# Patient Record
Sex: MALE | Race: WHITE | URBAN - METROPOLITAN AREA
[De-identification: names, ages, dates, MRNs, and addresses within clinical notes are randomized per-mention and may not be internally consistent; named-entity substitution may affect disease eponyms.]

---

## 2017-09-15 NOTE — PROGRESS NOTES
"SUBJECTIVE:   CC: Prabhjot Artis is an 32 year old male who presents for preventative health visit.     Physical   Annual:     Getting at least 3 servings of Calcium per day::  NO    Bi-annual eye exam::  Yes    Dental care twice a year::  Yes    Sleep apnea or symptoms of sleep apnea::  Excessive snoring    Taking medications regularly::  Yes    Medication side effects::  None    Additional concerns today::  YES    1) sleep, excessive snoring -- heavy snoring with suspected apneic spells    2) right elbow pain, onset 3 weeks, don't recalled hurting -- pain located over the right olecranon--pain with extension at times.    3) anxiety , getting worse - changed jobs May 2017 with increased stress and anxiety--new position in [employer]. Previous use of Lexapro gave significant side effects. Currently appears to have anxiety attacks which could be consistent with panic attacks. He does not have symptoms daily. It appears that he wishes to have something on hand to dampen down the anxiety which makes it impossible to relax. We'll look at intermittent use of Ativan as needed and follow-up. Has had counseling in the past for anxiety and wishes not to pursue this.      Anxiety Follow-Up--became an acute problem with a recent change in job and new area of responsibility. 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 He feels a good deal of stress. There are days now that he has improved somewhat without anxiety but is still has what appear to be panic attacks.      Status since last visit: Worsened . Patient have not take the medication since first prescribed due of feeling dizziness and nausea     Other associated symptoms: \"high level anxiety with episode\"     Complicating factors:   Significant life event: Yes-  Completed new career    Current substance abuse: None  Depression symptoms: No  KINA-7 SCORE 2/4/2015   Total Score 16       GAD7      Asthma Follow-Up--has been off Advair and needs to return to " this. Having increasing symptoms of coughing and shortness of breath. Also has restarted smoking.    Was ACT completed today?  No      Respiratory symptoms:   Cough: No    Wheezing: Yes-  Couple episode    Shortness of breath: Yes-  More of shortness of breath     Use of short- acting(rescue) inhaler: Yes, will need refills     Taking controlled (daily) meds as prescribed: Yes    ER/UC visits or hospital admissions since last visit: [facility] - Last year, patient states he got shot of prednisone     Recent asthma triggers that patient is dealing with: dust mites and mold             Today's PHQ-2 Score:   PHQ-2 ( 1999 Pfizer) 9/20/2017   Q1: Little interest or pleasure in doing things 0   Q2: Feeling down, depressed or hopeless 0   PHQ-2 Score 0   Q1: Little interest or pleasure in doing things Not at all   Q2: Feeling down, depressed or hopeless Not at all   PHQ-2 Score 0       Abuse: Current or Past(Physical, Sexual or Emotional)- No  Do you feel safe in your environment - Yes    Social History   Substance Use Topics     Smoking status: Current Every Day Smoker     Packs/day: 0.25     Years: 10.00     Types: Cigarettes     Smokeless tobacco: Never Used      Comment: 3-4 cigarettes per day     Alcohol use 10.5 oz/week     21 Standard drinks or equivalent per week     The patient does not drink >3 drinks per day nor >7 drinks per week.    Last PSA: No results found for: PSA    Reviewed orders with patient. Reviewed health maintenance and updated orders accordingly - Yes      Reviewed and updated as needed this visit by clinical staff  Tobacco  Allergies  Med Hx  Surg Hx  Fam Hx  Soc Hx        Reviewed and updated as needed this visit by Provider              ROS:  C: NEGATIVE for fever, chills, change in weight  CONSTITUTIONAL: Is overweight  I: NEGATIVE for worrisome rashes, moles or lesions  E: NEGATIVE for vision changes or irritation  ENT: NEGATIVE for ear, mouth and throat problems  RESP: Increasing mild  "intermittent asthma symptoms due to being off maintenance medication. Also restarted smoking. Reported to have increased snoring episodes and also has awakened himself at one time gasping for breath and his wife has noted some apneic spells. He has never been tested for sleep apnea but symptoms appear to have a strong likelihood.  B: NEGATIVE for masses, tenderness or discharge  CV: NEGATIVE for chest pain, palpitations or peripheral edema  GI: NEGATIVE for nausea, abdominal pain, heartburn, or change in bowel habits   male: negative for dysuria, hematuria, decreased urinary stream, erectile dysfunction, urethral discharge  MUSCULOSKELETAL: Intermittent pain at the right olecranon with forceful extension of his right arm. No known trauma or swelling. No neurologic changes.  N: NEGATIVE for weakness, dizziness or paresthesias  E: NEGATIVE for temperature intolerance, skin/hair changes  H: NEGATIVE for bleeding problems  PSYCHIATRIC: Increasing anxiety with some panic feelings. No depression appears noted.    OBJECTIVE:   /90  Pulse 68  Temp 98.5  F (36.9  C) (Temporal)  Resp 16  Ht 5' 10\" (1.778 m)  Wt 220 lb (99.8 kg)  BMI 31.57 kg/m2    EXAM:  GENERAL: healthy, alert and no distress  EYES: Eyes grossly normal to inspection, PERRL and conjunctivae and sclerae normal  HENT: ear canals and TM's normal, nose and mouth without ulcers or lesions  NECK: no adenopathy, no asymmetry, masses, or scars and thyroid normal to palpation  RESP: lungs clear to auscultation - no rales, rhonchi or wheezes  BREAST: normal without masses, tenderness or nipple discharge and no palpable axillary masses or adenopathy  CV: regular rate and rhythm, normal S1 S2, no S3 or S4, no murmur, click or rub, no peripheral edema and peripheral pulses strong  ABDOMEN: soft, nontender, no hepatosplenomegaly, no masses and bowel sounds normal   (male): normal male genitalia without lesions or urethral discharge, no hernia  RECTAL: " deferred  MS: no gross musculoskeletal defects noted, no edema  MS: Examination of the right elbow appears to show normal range of motion, no olecranon swelling or definite tenderness.  SKIN: no suspicious lesions or rashes  NEURO: Normal strength and tone, mentation intact and speech normal  PSYCH: mentation appears normal, affect normal/bright  LYMPH: no cervical, supraclavicular, axillary, or inguinal adenopathy    ASSESSMENT/PLAN:   1. Encounter for general adult medical examination with abnormal findings   General physical examination completed  - PREVENTIVE VISIT,EST,18-39    2. Moderate persistent asthma without complication  Restarting Advair and albuterol as needed.  - fluticasone-salmeterol (ADVAIR) 250-50 MCG/DOSE diskus inhaler; Inhale 1 puff into the lungs 2 times daily  Dispense: 3 Inhaler; Refill: 1  - OFFICE/OUTPT VISIT,EST,LEVL III    3. Hyperlipidemia with target LDL less than 160  To be checking lipids and fasting studies in the near future  - Lipid panel reflex to direct LDL; Future  - Comprehensive metabolic panel; Future    4. Anxiety disorder, unspecified type   To utilize Ativan on a short-term and intermittent basis and follow-up in 2 months to assess need for Ativan and possible alternatives. The use may very well decrease as his job becomes more routine.  - Lorazepam (ATIVAN) 0.5 MG tablet; Take 1 tablet (0.5 mg) by mouth every 12 hours as needed for anxiety  Dispense: 30 tablet; Refill: 0  - OFFICE/OUTPT VISIT,EST,LEVL III    5. Snoring  Strong likelihood of sleep apnea. Referral made  - SLEEP EVALUATION & MANAGEMENT REFERRAL - ADULT; Future  - OFFICE/OUTPT VISIT,EST,LEVL III    6. Apneic episode  Strong likelihood of sleep apnea, referral made  - SLEEP EVALUATION & MANAGEMENT REFERRAL - ADULT; Future  - OFFICE/OUTPT VISIT,EST,LEVL III    7. Other fatigue  Likely secondary to sleep apnea syndrome. Laboratory values pending  - SLEEP EVALUATION & MANAGEMENT REFERRAL - ADULT; Future  - CBC  "with platelets; Future  - TSH with free T4 reflex; Future  - Testosterone, total; Future    8. Overweight  Long-term weight loss needed    9. Tobacco use disorder  Patient did well with Chantix in the past and this will be prescribed for 3 months.  - TOBACCO CESSATION ORDER FOR HM  - varenicline (CHANTIX STARTING MONTH GODWIN) 0.5 MG X 11 & 1 MG X 42 tablet; Take 0.5 mg tab daily for 3 days, then 0.5 mg tab twice daily for 4 days, then 1 mg twice daily.  Dispense: 53 tablet; Refill: 0  - varenicline (CHANTIX) 1 MG tablet; Take 1 tablet (1 mg) by mouth 2 times daily  Dispense: 56 tablet; Refill: 1    10. Need for prophylactic vaccination and inoculation against influenza  Given today.  - FLU VAC, SPLIT VIRUS IM > 3 YO (QUADRIVALENT) [53036]  - Vaccine Administration, Initial [08003]    COUNSELING:   Reviewed preventive health counseling, as reflected in patient instructions  Special attention given to:        Regular exercise       Healthy diet/nutrition       Immunizations    Vaccinated for: Influenza          BP Screening:   Last 3 BP Readings:    BP Readings from Last 3 Encounters:   09/20/17 128/80   02/04/15 140/90       The following was recommended to the patient:  Re-screen BP within a year and recommended lifestyle modifications       reports that he has been smoking Cigarettes.  He has a 2.50 pack-year smoking history. He has never used smokeless tobacco.  Tobacco Cessation Action Plan: Pharmacotherapies : Chantix  Self help information given to patient  Estimated body mass index is 31.57 kg/(m^2) as calculated from the following:    Height as of this encounter: 5' 10\" (1.778 m).    Weight as of this encounter: 220 lb (99.8 kg).   Weight management plan: Discussed healthy diet and exercise guidelines and patient will follow up in 12 months in clinic to re-evaluate.    Counseling Resources:  ATP IV Guidelines  Pooled Cohorts Equation Calculator  FRAX Risk Assessment  ICSI Preventive Guidelines  Dietary " Guidelines for Americans, 2010  USDA's MyPlate  ASA Prophylaxis  Lung CA Screening    Return in 2 months for reevaluation of anxiety and need for Ativan.    The patient understood the rational for the diagnosis and treatment plan. All questions were answered to best of my ability and the patient's satisfaction.    Note: Chart documentation done in part with Dragon Voice Recognition software. Although reviewed after completion, some word and grammatical errors may remain.  Please consider this when interpreting information in this chart.      Haider Bird MD  [facility]    Body mass index is 31.57 kg/(m^2).  Weight management plan: Discussed healthy diet and exercise guidelines and patient will follow up in 12 months in clinic to re-evaluate.   Haider Bird MD

## 2017-09-15 NOTE — PATIENT INSTRUCTIONS
Preventive Health Recommendations  Male Ages 26 - 39    Yearly exam:             See your health care provider every year in order to  o   Review health changes.   o   Discuss preventive care.    o   Review your medicines if your doctor has prescribed any.    You should be tested each year for STDs (sexually transmitted diseases), if you re at risk.     After age 35, talk to your provider about cholesterol testing. If you are at risk for heart disease, have your cholesterol tested at least every 5 years.     If you are at risk for diabetes, you should have a diabetes test (fasting glucose).  Shots: Get a flu shot each year. Get a tetanus shot every 10 years.     Nutrition:    Eat at least 5 servings of fruits and vegetables daily.     Eat whole-grain bread, whole-wheat pasta and brown rice instead of white grains and rice.     Talk to your provider about Calcium and Vitamin D.     Lifestyle    Exercise for at least 150 minutes a week (30 minutes a day, 5 days a week). This will help you control your weight and prevent disease.     Limit alcohol to one drink per day.     No smoking.     Wear sunscreen to prevent skin cancer.     See your dentist every six months for an exam and cleaning.     These are new changes to your current plan of care based on today's visit:    Medications stopped    Medications to be started Chantix, Lorazepam, Advair, Albuterol   Change dose of this medication    New treatments        Follow up appointments:    1.  FOLLOW UP WITH YOUR PRIMARY CARE PROVIDER: 2 month(s) for follow up    2. FOLLOW UP WITH SPECIALIST: Sleep specialist    3. FOLLOW UP WITH NURSE VISIT:     4. IMAGING STUDIES TO BE SCHEDULED:     5. PROCEDURES TO BE SCHEDULED:     6. LABS TO BE COMPLETED PRIOR TO NEXT VISIT: Return for fasting blood work in the next few weeks        Varenicline Tartrate (Chantix):  Chantix tablets Days                Dosage  0.5 mg                     1 through 3 0.5 mg once daily  0.5 mg         "              4 through 7 0.5 mg twice daily  1.0 mg                     Day 8 to end  1 mg twice daily    Renal dosing:  For severe renal impairment, start at a dose of 0.5 mg daily and then titrate as needed to a maximum dose of 0.5 mg twice daily.  For patients with end-stage renal disease (ESRD) undergoing hemodialysis, a maximum dose of 0.5 mg daily may be administered if tolerated well.    How to take:  You will start taking Chantix one week before your quit date while you are still smoking. At first you will use the Chantix starter pack. Once you are finished with that, you will be on a \"maintenance dose\" that will probably stay the same for the rest of your treatment.  After a week of slowly increasing your dose, you will take Chantix twice a day. Take each dose after eating and with a full glass of water. Taking Chantix with food and water decreases nausea. That is also why you begin on a lower dose and slowly increase it. Once you begin the twice a day dosage, your 2 doses should be at least 8-10 hours apart and at least 4-5 hours before bedtime.      Duration of therapy: 12 weeks.  An additional course of 12 weeks of treatment is recommended if the first 12 weeks were successful to improve long term abstinence.    Adverse reactions:  The most common adverse reaction associated with varenicline treatment is nausea. Other common adverse effects include sleep disturbance, constipation, flatulence and vomiting.    There have been warnings from the FDA to be on the lookout for erratic behavior, suicidal ideation, or suicidal behavior.   There is one case report of a death, though it appears alcohol consumption may have played a part in that case.    Please report any behavior or mood changes as well as being aware of drowsiness.   Be cautious when operating motor vehicles or dangerous machinery until the medication's effect on you is clear.    HOW TO QUIT SMOKING  Smoking is one of the hardest habits to break. " About half of all those who have ever smoked have been able to quit, and most of those (about 70%) who still smoke want to quit. Here are some of the best ways to stop smoking.     KEEP TRYING:  It takes most smokers about 8 tries before they are finally able to fully quit. So, the more often you try and fail, the better your chance of quitting the next time! So, don't give up!    GO COLD TURKEY:  Most ex-smokers quit cold turkey. Trying to cut back gradually doesn't seem to work as well, perhaps because it continues the smoking habit. Also, it is possible to fool yourself by inhaling more while smoking fewer cigarettes. This results in the same amount of nicotine in your body!    GET SUPPORT:  Support programs can make an important difference, especially for the heavy smoker. These groups offer lectures, methods to change your behavior and peer support. Call the free national Quitline for more information. 800-QUIT-NOW (212-771-4807). Low-cost or free programs are offered by many hospitals, local chapters of the American Lung Association (888-295-6619) and the American Cancer Society (145-793-6975). Support at home is important too. Non-smokers can help by offering praise and encouragement. If the smoker fails to quit, encourage them to try again!    OVER-THE-COUNTER MEDICINES:  For those who can't quit on their own, Nicotine Replacement Therapy (NRT) may make quitting much easier. Certain aids such as the nicotine patch, gum and lozenge are available without a prescription. However, it is best to use these under the guidance of your doctor. The skin patch provides a steady supply of nicotine to the body. Nicotine gum and lozenge gives temporary bursts of low levels of nicotine. Both methods take the edge off the craving for cigarettes. WARNING: If you feel symptoms of nicotine overdose, such as nausea, vomiting, dizziness, weakness, or fast heartbeat, stop using these and see your doctor.    PRESCRIPTION  MEDICINES:  After evaluating your smoking patterns and prior attempts at quitting, your doctor may offer a prescription medicine such as bupropion (Zyban, Wellbutrin), varenicline (Chantix, Champix), a niocotine inhaler or nasal spray. Each has its unique advantage and side effects which your doctor can review with you.    HEALTH BENEFITS OF QUITTING:  The benefits of quitting start right away and keep improving the longer you go without smokin minutes: blood pressure and pulse return to normal  8 hours: oxygen levels return to normal  2 days: ability to smell and taste begins to improve as damaged nerves start to regrow  2-3 weeks: circulation and lung function improves  1-9 months: decreased cough, congestion and shortness of breath; less tired  1 year: risk of heart attack decreases by half  5 years: risk of lung cancer decreases by half; risk of stroke becomes the same as a non-smoker  For information about how to quit smoking, visit the following links:  National Cancer [place] ,   Clearing the Air, Quit Smoking Today   - an online booklet. http://www.smokefree.gov/pubs/clearing_the_air.pdf  Smokefree.gov http://smokefree.gov/  QuitNet http://www.quitnet.com/    0830-6583 [facility], [address] [place] [address], [place] [address]. All rights reserved. This information is not intended as a substitute for professional medical care. Always follow your healthcare professional's instructions.    The Benefits of Living Smoke Free  What do you want to gain from quitting? Check off some reasons to quit.  Health Benefits  ___ Reduce my risk of lung cancer, heart disease, chronic lung disease  ___ Have fewer wrinkles and softer skin  ___ Improve my sense of taste and smell  ___ For pregnant women reduce the risk of having a miscarriage, stillbirth, premature birth, or low-birth-weight baby  Personal Benefits  ___ Feel more in control of my life  ___ Have better-smelling hair, breath, clothes, home, and car  ___  Save time by not having to take smoke breaks, buy cigarettes, or hunt for a light  ___ Have whiter teeth  Family Benefits  ___ Reduce my children s respiratory tract infections  ___ Set a good example for my children  ___ Reduce my family s cancer risk  Financial Benefits  ___ Save hundreds of dollars each year that would be spent on cigarettes  ___ Save money on medical bills  ___ Save on life, health, and car insurance premiums    Those Dollars Add Up!  Cigarettes are expensive, and getting more expensive all the time. Do you realize how much money you are spending on cigarettes per year? What is the average amount you spend on a pack of cigarettes? What is the average number of packs that you smoke per day? Using your answers to these questions, fill in this formula to help you find out:  ($ _____ per pack) ×  ( _____ number of packs per day) × (365 days) =  $ _____ yearly cost of smoking  Besides tobacco, there are other costs, including extra cleaning bills and replacement costs for clothing and furniture; medical expenses for smoking-related illnesses; and higher health, life, and car insurance premiums.    Cigars and Pipes Count Too!  Cigars and pipes are also dangerous. So are smokeless (chewing) tobacco and snuff. All of these products contain nicotine, a highly addictive substance that has harmful effects on your body. Quitting smoking means giving up all tobacco products.      4928-5691 [facility], [address] [place] [address], [place] [address]. All rights reserved. This information is not intended as a substitute for professional medical care. Always follow your healthcare professional's instructions.

## 2017-09-20 ENCOUNTER — OFFICE VISIT (OUTPATIENT)
Dept: FAMILY MEDICINE | Facility: CLINIC | Age: 32
End: 2017-09-20
Payer: COMMERCIAL

## 2017-09-20 VITALS
HEIGHT: 70 IN | RESPIRATION RATE: 16 BRPM | HEART RATE: 68 BPM | SYSTOLIC BLOOD PRESSURE: 128 MMHG | WEIGHT: 220 LBS | BODY MASS INDEX: 31.5 KG/M2 | DIASTOLIC BLOOD PRESSURE: 80 MMHG | TEMPERATURE: 98.5 F

## 2017-09-20 DIAGNOSIS — F17.200 TOBACCO USE DISORDER: ICD-10-CM

## 2017-09-20 DIAGNOSIS — Z00.01 ENCOUNTER FOR GENERAL ADULT MEDICAL EXAMINATION WITH ABNORMAL FINDINGS: Primary | ICD-10-CM

## 2017-09-20 DIAGNOSIS — R53.83 OTHER FATIGUE: ICD-10-CM

## 2017-09-20 DIAGNOSIS — J45.40 MODERATE PERSISTENT ASTHMA WITHOUT COMPLICATION: ICD-10-CM

## 2017-09-20 DIAGNOSIS — F41.9 ANXIETY DISORDER, UNSPECIFIED TYPE: ICD-10-CM

## 2017-09-20 DIAGNOSIS — Z23 NEED FOR PROPHYLACTIC VACCINATION AND INOCULATION AGAINST INFLUENZA: ICD-10-CM

## 2017-09-20 DIAGNOSIS — R06.81 APNEIC EPISODE: ICD-10-CM

## 2017-09-20 DIAGNOSIS — E78.5 HYPERLIPIDEMIA WITH TARGET LDL LESS THAN 160: ICD-10-CM

## 2017-09-20 DIAGNOSIS — R06.83 SNORING: ICD-10-CM

## 2017-09-20 DIAGNOSIS — E66.3 OVERWEIGHT: ICD-10-CM

## 2017-09-20 PROCEDURE — 90471 IMMUNIZATION ADMIN: CPT | Performed by: FAMILY MEDICINE

## 2017-09-20 PROCEDURE — 99395 PREV VISIT EST AGE 18-39: CPT | Mod: 25 | Performed by: FAMILY MEDICINE

## 2017-09-20 PROCEDURE — 90686 IIV4 VACC NO PRSV 0.5 ML IM: CPT | Performed by: FAMILY MEDICINE

## 2017-09-20 PROCEDURE — 99213 OFFICE O/P EST LOW 20 MIN: CPT | Mod: 25 | Performed by: FAMILY MEDICINE

## 2017-09-20 RX ORDER — LORAZEPAM 0.5 MG/1
0.5 TABLET ORAL EVERY 12 HOURS PRN
Qty: 30 TABLET | Refills: 0 | Status: SHIPPED | OUTPATIENT
Start: 2017-09-20

## 2017-09-20 RX ORDER — VARENICLINE TARTRATE 1 MG/1
1 TABLET, FILM COATED ORAL 2 TIMES DAILY
Qty: 56 TABLET | Refills: 1 | Status: SHIPPED | OUTPATIENT
Start: 2017-10-20 | End: 2017-11-03

## 2017-09-20 RX ORDER — ALBUTEROL SULFATE 90 UG/1
2 AEROSOL, METERED RESPIRATORY (INHALATION) EVERY 6 HOURS PRN
Qty: 3 INHALER | Refills: 1 | Status: SHIPPED | OUTPATIENT
Start: 2017-09-20

## 2017-09-20 ASSESSMENT — PAIN SCALES - GENERAL: PAINLEVEL: MILD PAIN (3)

## 2017-09-20 ASSESSMENT — ANXIETY QUESTIONNAIRES
2. NOT BEING ABLE TO STOP OR CONTROL WORRYING: MORE THAN HALF THE DAYS
3. WORRYING TOO MUCH ABOUT DIFFERENT THINGS: NEARLY EVERY DAY
6. BECOMING EASILY ANNOYED OR IRRITABLE: SEVERAL DAYS
GAD7 TOTAL SCORE: 15
1. FEELING NERVOUS, ANXIOUS, OR ON EDGE: NEARLY EVERY DAY
5. BEING SO RESTLESS THAT IT IS HARD TO SIT STILL: NEARLY EVERY DAY
IF YOU CHECKED OFF ANY PROBLEMS ON THIS QUESTIONNAIRE, HOW DIFFICULT HAVE THESE PROBLEMS MADE IT FOR YOU TO DO YOUR WORK, TAKE CARE OF THINGS AT HOME, OR GET ALONG WITH OTHER PEOPLE: SOMEWHAT DIFFICULT
7. FEELING AFRAID AS IF SOMETHING AWFUL MIGHT HAPPEN: SEVERAL DAYS

## 2017-09-20 ASSESSMENT — PATIENT HEALTH QUESTIONNAIRE - PHQ9
5. POOR APPETITE OR OVEREATING: MORE THAN HALF THE DAYS
SUM OF ALL RESPONSES TO PHQ QUESTIONS 1-9: 8

## 2017-09-20 NOTE — NURSING NOTE
"Chief Complaint   Patient presents with     Physical     Panel Management     flu shot, tobacco, lipid, aap, act       Initial /90  Pulse 68  Temp 98.5  F (36.9  C) (Temporal)  Resp 16  Ht 5' 10\" (1.778 m)  Wt 220 lb (99.8 kg)  BMI 31.57 kg/m2 Estimated body mass index is 31.57 kg/(m^2) as calculated from the following:    Height as of this encounter: 5' 10\" (1.778 m).    Weight as of this encounter: 220 lb (99.8 kg).  Medication Reconciliation: complete       Jeramie Damico MA    "

## 2017-09-20 NOTE — PROGRESS NOTES
Injectable Influenza Immunization Documentation    1.  Is the person to be vaccinated sick today?   No    2. Does the person to be vaccinated have an allergy to a component   of the vaccine?   No    3. Has the person to be vaccinated ever had a serious reaction   to influenza vaccine in the past?   No    4. Has the person to be vaccinated ever had Guillain-Barré syndrome?   No    Form completed by   Jeramie Damico MA

## 2017-09-20 NOTE — LETTER
My Asthma Action Plan  Name: Prabhjot Artis   YOB: 1985  Date: 9/20/2017   My doctor: Haider Bird MD   My clinic: [facility]        My Control Medicine: Fluticasone + salmeterol (Advair) -  Diskus 250/50 mcg One puff twice daily  My Rescue Medicine: Albuterol (Proair/Ventolin/Proventil) inhaler Twopuffs every 4 hours as needed for asthma   My Asthma Severity: mild persistent  Avoid your asthma triggers: As noted  dust mites  mold            GREEN ZONE   Good Control    I feel good    No cough or wheeze    Can work, sleep and play without asthma symptoms       Take your asthma control medicine every day.     1. If exercise triggers your asthma, take your rescue medication    15 minutes before exercise or sports, and    During exercise if you have asthma symptoms  2. Spacer to use with inhaler: If you have a spacer, make sure to use it with your inhaler             YELLOW ZONE Getting Worse  I have ANY of these:    I do not feel good    Cough or wheeze    Chest feels tight    Wake up at night   1. Keep taking your Green Zone medications  2. Start taking your rescue medicine:    every 20 minutes for up to 1 hour. Then every 4 hours for 24-48 hours.  3. If you stay in the Yellow Zone for more than 12-24 hours, contact your doctor.  4. If you do not return to the Green Zone in 12-24 hours or you get worse, start taking your oral steroid medicine if prescribed by your provider.           RED ZONE Medical Alert - Get Help  I have ANY of these:    I feel awful    Medicine is not helping    Breathing getting harder    Trouble walking or talking    Nose opens wide to breathe       1. Take your rescue medicine NOW  2. If your provider has prescribed an oral steroid medicine, start taking it NOW  3. Call your doctor NOW  4. If you are still in the Red Zone after 20 minutes and you have not reached your doctor:    Take your rescue medicine again and    Call 911 or go to the emergency room  right away    See your regular doctor within 2 weeks of an Emergency Room or Urgent Care visit for follow-up treatment.        Electronically signed by: Haider Bird, September 20, 2017    Annual Reminders:  Meet with Asthma Educator,  Flu Shot in the Fall, consider Pneumonia Vaccination for patients with asthma (aged 19 and older).    Pharmacy:    [id number] [id number] AT [id number] - [place], [place] - [address]                    Asthma Triggers  How To Control Things That Make Your Asthma Worse    Triggers are things that make your asthma worse.  Look at the list below to help you find your triggers and what you can do about them.  You can help prevent asthma flare-ups by staying away from your triggers.      Trigger                                                          What you can do   Cigarette Smoke  Tobacco smoke can make asthma worse. Do not allow smoking in your home, car or around you.  Be sure no one smokes at a child s day care or school.  If you smoke, ask your health care provider for ways to help you quit.  Ask family members to quit too.  Ask your health care provider for a referral to Quit Plan to help you quit smoking, or call 9-273-341-PLAN.     Colds, Flu, Bronchitis  These are common triggers of asthma. Wash your hands often.  Don t touch your eyes, nose or mouth.  Get a flu shot every year.     Dust Mites  These are tiny bugs that live in cloth or carpet. They are too small to see. Wash sheets and blankets in hot water every week.   Encase pillows and mattress in dust mite proof covers.  Avoid having carpet if you can. If you have carpet, vacuum weekly.   Use a dust mask and HEPA vacuum.   Pollen and Outdoor Mold  Some people are allergic to trees, grass, or weed pollen, or molds. Try to keep your windows closed.  Limit time out doors when pollen count is high.   Ask you health care provider about  taking medicine during allergy season.     Animal Dander  Some people are allergic to skin flakes, urine or saliva from pets with fur or feathers. Keep pets with fur or feathers out of your home.    If you can t keep the pet outdoors, then keep the pet out of your bedroom.  Keep the bedroom door closed.  Keep pets off cloth furniture and away from stuffed toys.     Mice, Rats, and Cockroaches  Some people are allergic to the waste from these pests.   Cover food and garbage.  Clean up spills and food crumbs.  Store grease in the refrigerator.   Keep food out of the bedroom.   Indoor Mold  This can be a trigger if your home has high moisture. Fix leaking faucets, pipes, or other sources of water.   Clean moldy surfaces.  Dehumidify basement if it is damp and smelly.   Smoke, Strong Odors, and Sprays  These can reduce air quality. Stay away from strong odors and sprays, such as perfume, powder, hair spray, paints, smoke incense, paint, cleaning products, candles and new carpet.   Exercise or Sports  Some people with asthma have this trigger. Be active!  Ask your doctor about taking medicine before sports or exercise to prevent symptoms.    Warm up for 5-10 minutes before and after sports or exercise.     Other Triggers of Asthma  Cold air:  Cover your nose and mouth with a scarf.  Sometimes laughing or crying can be a trigger.  Some medicines and food can trigger asthma.

## 2017-09-20 NOTE — MR AVS SNAPSHOT
After Visit Summary   9/20/2017    Prabhjot Artis    MRN: [id number]           Patient Information     Date Of Birth          1985        Visit Information        Provider Department      9/20/2017 4:20 PM Haider Bird MD [facility]        Today's Diagnoses     Encounter for general adult medical examination with abnormal findings    -  1    Tobacco use disorder        Need for prophylactic vaccination and inoculation against influenza        Moderate persistent asthma without complication        Hyperlipidemia with target LDL less than 160        Anxiety disorder, unspecified type        Snoring        Apneic episode        Other fatigue        Exercise-induced asthma          Care Instructions      Preventive Health Recommendations  Male Ages 26 - 39    Yearly exam:             See your health care provider every year in order to  o   Review health changes.   o   Discuss preventive care.    o   Review your medicines if your doctor has prescribed any.    You should be tested each year for STDs (sexually transmitted diseases), if you re at risk.     After age 35, talk to your provider about cholesterol testing. If you are at risk for heart disease, have your cholesterol tested at least every 5 years.     If you are at risk for diabetes, you should have a diabetes test (fasting glucose).  Shots: Get a flu shot each year. Get a tetanus shot every 10 years.     Nutrition:    Eat at least 5 servings of fruits and vegetables daily.     Eat whole-grain bread, whole-wheat pasta and brown rice instead of white grains and rice.     Talk to your provider about Calcium and Vitamin D.     Lifestyle    Exercise for at least 150 minutes a week (30 minutes a day, 5 days a week). This will help you control your weight and prevent disease.     Limit alcohol to one drink per day.     No smoking.     Wear sunscreen to prevent skin cancer.     See your dentist every six months for an exam and  cleaning.     These are new changes to your current plan of care based on today's visit:    Medications stopped    Medications to be started Chantix, Lorazepam, Advair, Albuterol   Change dose of this medication    New treatments        Follow up appointments:    1.  FOLLOW UP WITH YOUR PRIMARY CARE PROVIDER: 2 month(s) for follow up    2. FOLLOW UP WITH SPECIALIST: Sleep specialist    3. FOLLOW UP WITH NURSE VISIT:     4. IMAGING STUDIES TO BE SCHEDULED:     5. PROCEDURES TO BE SCHEDULED:     6. LABS TO BE COMPLETED PRIOR TO NEXT VISIT: Return for fasting blood work in the next few weeks                Follow-ups after your visit        Additional Services     SLEEP EVALUATION & MANAGEMENT REFERRAL - ADULT       Please be aware that coverage of these services is subject to the terms and limitations of your health insurance plan.  Call member services at your health plan with any benefit or coverage questions.      Please bring the following to your appointment:    >>   List of current medications   >>   This referral request   >>   Any documents/labs given to you for this referral    [facility] [phone] (Age 15 and up)                  Follow-up notes from your care team     Return in about 2 years (around 9/20/2019) for Routine Visit.      Future tests that were ordered for you today     Open Future Orders        Priority Expected Expires Ordered    CBC with platelets Routine 9/21/2017 10/13/2017 9/20/2017    Lipid panel reflex to direct LDL Routine 9/21/2017 10/13/2017 9/20/2017    Comprehensive metabolic panel Routine 9/21/2017 10/13/2017 9/20/2017    TSH with free T4 reflex Routine 9/21/2017 10/13/2017 9/20/2017    Testosterone, total Routine 9/21/2017 10/13/2017 9/20/2017    SLEEP EVALUATION & MANAGEMENT REFERRAL - ADULT Routine  9/20/2018 9/20/2017            Who to contact     If you have questions or need follow up information about today's clinic visit or your schedule  "please contact [facility] directly at [phone].  Normal or non-critical lab and imaging results will be communicated to you by MyChart, letter or phone within 4 business days after the clinic has received the results. If you do not hear from us within 7 days, please contact the clinic through MyChart or phone. If you have a critical or abnormal lab result, we will notify you by phone as soon as possible.  Submit refill requests through [employer] or call your pharmacy and they will forward the refill request to us. Please allow 3 business days for your refill to be completed.          Additional Information About Your Visit        [employer] Information     [employer] lets you send messages to your doctor, view your test results, renew your prescriptions, schedule appointments and more. To sign up, go to [email or web address] . Click on \"Log in\" on the left side of the screen, which will take you to the Welcome page. Then click on \"Sign up Now\" on the right side of the page.     You will be asked to enter the access code listed below, as well as some personal information. Please follow the directions to create your username and password.     Your access code is: [email or web address]  Expires: 2017  5:11 PM     Your access code will  in 90 days. If you need help or a new code, please call your [place] clinic or [phone].        Care EveryWhere ID     This is your Care EveryWhere ID. This could be used by other organizations to access your [place] medical records  [id number]        Your Vitals Were     Pulse Temperature Respirations Height BMI (Body Mass Index)       68 98.5  F (36.9  C) (Temporal) 16 5' 10\" (1.778 m) 31.57 kg/m2        Blood Pressure from Last 3 Encounters:   17 128/80   02/04/15 140/90    Weight from Last 3 Encounters:   17 220 lb (99.8 kg)   02/04/15 210 lb (95.3 kg)              We Performed the Following     Asthma Action Plan (AAP)     TOBACCO CESSATION ORDER " FOR           Today's Medication Changes          These changes are accurate as of: 9/20/17  5:11 PM.  If you have any questions, ask your nurse or doctor.               Start taking these medicines.        Dose/Directions    LORazepam 0.5 MG tablet   Commonly known as:  ATIVAN   Used for:  Anxiety disorder, unspecified type   Started by:  Haider Bird MD        Dose:  0.5 mg   Take 1 tablet (0.5 mg) by mouth every 12 hours as needed for anxiety   Quantity:  30 tablet   Refills:  0         These medicines have changed or have updated prescriptions.        Dose/Directions    * varenicline 0.5 MG X 11 & 1 MG X 42 tablet   Commonly known as:  CHANTIX STARTING MONTH PAK   This may have changed:  Another medication with the same name was added. Make sure you understand how and when to take each.   Used for:  Tobacco use disorder   Changed by:  Haider Bird MD        Take 0.5 mg tab daily for 3 days, then 0.5 mg tab twice daily for 4 days, then 1 mg twice daily.   Quantity:  53 tablet   Refills:  0       * varenicline 1 MG tablet   Commonly known as:  CHANTIX   This may have changed:  You were already taking a medication with the same name, and this prescription was added. Make sure you understand how and when to take each.   Used for:  Tobacco use disorder   Changed by:  Haider Bird MD        Dose:  1 mg   Start taking on:  10/20/2017   Take 1 tablet (1 mg) by mouth 2 times daily   Quantity:  56 tablet   Refills:  1       * Notice:  This list has 2 medication(s) that are the same as other medications prescribed for you. Read the directions carefully, and ask your doctor or other care provider to review them with you.      Stop taking these medicines if you haven't already. Please contact your care team if you have questions.     escitalopram 20 MG tablet   Commonly known as:  LEXAPRO   Stopped by:  Haider Bird MD                Where to get your medicines      These medications  were sent to [facility]pharmacy [facility][id number] - [place], [place] - [address]  [address] , [place] [address]     Phone:  [phone]     albuterol 108 (90 BASE) MCG/ACT Inhaler    fluticasone-salmeterol 250-50 MCG/DOSE diskus inhaler    varenicline 0.5 MG X 11 & 1 MG X 42 tablet    varenicline 1 MG tablet         Some of these will need a paper prescription and others can be bought over the counter.  Ask your nurse if you have questions.     Bring a paper prescription for each of these medications     LORazepam 0.5 MG tablet                Primary Care Provider Office Phone # Fax #    Haider Bird [phone] [address] [address]        Equal Access to Services     TRACE FLORES : Hadii aad ku hadasho Soradha, waaxda luqadaha, qaybta kaalmada adeegyayovany, lissa gillette . So [facility] [phone].    ATENCIÓN: Si habla español, tiene a dubon disposición servicios gratuitos de asistencia lingüística. [facility] [phone].    We comply with applicable federal civil rights laws and [place] laws. We do not discriminate on the basis of race, color, national origin, age, disability sex, sexual orientation or gender identity.            Thank you!     Thank you for choosing [facility]  for your care. Our goal is always to provide you with excellent care. Hearing back from our patients is one way we can continue to improve our services. Please take a few minutes to complete the written survey that you may receive in the mail after your visit with us. Thank you!             Your Updated Medication List - Protect others around you: Learn how to safely use, store and throw away your medicines at www.disposemymeds.org.          This list is accurate as of: 9/20/17  5:11 PM.  Always use your most recent med list.                   Brand Name Dispense Instructions for use Diagnosis    albuterol 108 (90 BASE) MCG/ACT Inhaler    PROAIR  HFA/PROVENTIL HFA/VENTOLIN HFA    3 Inhaler    Inhale 2 puffs into the lungs every 6 hours as needed for shortness of breath / dyspnea or wheezing    Exercise-induced asthma       fluticasone-salmeterol 250-50 MCG/DOSE diskus inhaler    ADVAIR    3 Inhaler    Inhale 1 puff into the lungs 2 times daily    Moderate persistent asthma without complication       LORazepam 0.5 MG tablet    ATIVAN    30 tablet    Take 1 tablet (0.5 mg) by mouth every 12 hours as needed for anxiety    Anxiety disorder, unspecified type       NASACORT AQ NA           sodium chloride 0.65 % nasal spray    OCEAN     Spray 1 spray into both nostrils daily as needed for congestion        * varenicline 0.5 MG X 11 & 1 MG X 42 tablet    CHANTIX STARTING MONTH GODWIN    53 tablet    Take 0.5 mg tab daily for 3 days, then 0.5 mg tab twice daily for 4 days, then 1 mg twice daily.    Tobacco use disorder       * varenicline 1 MG tablet   Start taking on:  10/20/2017    CHANTIX    56 tablet    Take 1 tablet (1 mg) by mouth 2 times daily    Tobacco use disorder       * Notice:  This list has 2 medication(s) that are the same as other medications prescribed for you. Read the directions carefully, and ask your doctor or other care provider to review them with you.

## 2017-09-21 ASSESSMENT — ANXIETY QUESTIONNAIRES: GAD7 TOTAL SCORE: 15

## 2017-09-21 ASSESSMENT — ASTHMA QUESTIONNAIRES: ACT_TOTALSCORE: 18

## 2017-10-13 DIAGNOSIS — R53.83 OTHER FATIGUE: ICD-10-CM

## 2017-10-13 DIAGNOSIS — E78.5 HYPERLIPIDEMIA WITH TARGET LDL LESS THAN 160: ICD-10-CM

## 2017-10-13 LAB
ALBUMIN SERPL-MCNC: 4.1 G/DL (ref 3.4–5)
ALP SERPL-CCNC: 78 U/L (ref 40–150)
ALT SERPL W P-5'-P-CCNC: 42 U/L (ref 0–70)
ANION GAP SERPL CALCULATED.3IONS-SCNC: 6 MMOL/L (ref 3–14)
AST SERPL W P-5'-P-CCNC: 20 U/L (ref 0–45)
BILIRUB SERPL-MCNC: 1.2 MG/DL (ref 0.2–1.3)
BUN SERPL-MCNC: 15 MG/DL (ref 7–30)
CALCIUM SERPL-MCNC: 8.8 MG/DL (ref 8.5–10.1)
CHLORIDE SERPL-SCNC: 102 MMOL/L (ref 94–109)
CHOLEST SERPL-MCNC: 194 MG/DL
CO2 SERPL-SCNC: 28 MMOL/L (ref 20–32)
CREAT SERPL-MCNC: 1.1 MG/DL (ref 0.66–1.25)
ERYTHROCYTE [DISTWIDTH] IN BLOOD BY AUTOMATED COUNT: 12.6 % (ref 10–15)
GFR SERPL CREATININE-BSD FRML MDRD: 76 ML/MIN/1.7M2
GLUCOSE SERPL-MCNC: 110 MG/DL (ref 70–99)
HCT VFR BLD AUTO: 44.4 % (ref 40–53)
HDLC SERPL-MCNC: 34 MG/DL
HGB BLD-MCNC: 15.6 G/DL (ref 13.3–17.7)
LDLC SERPL CALC-MCNC: 96 MG/DL
MCH RBC QN AUTO: 31.4 PG (ref 26.5–33)
MCHC RBC AUTO-ENTMCNC: 35.1 G/DL (ref 31.5–36.5)
MCV RBC AUTO: 89 FL (ref 78–100)
NONHDLC SERPL-MCNC: 160 MG/DL
PLATELET # BLD AUTO: 259 10E9/L (ref 150–450)
POTASSIUM SERPL-SCNC: 4.6 MMOL/L (ref 3.4–5.3)
PROT SERPL-MCNC: 7.9 G/DL (ref 6.8–8.8)
RBC # BLD AUTO: 4.97 10E12/L (ref 4.4–5.9)
SODIUM SERPL-SCNC: 136 MMOL/L (ref 133–144)
TRIGL SERPL-MCNC: 319 MG/DL
TSH SERPL DL<=0.005 MIU/L-ACNC: 1.57 MU/L (ref 0.4–4)
WBC # BLD AUTO: 5.8 10E9/L (ref 4–11)

## 2017-10-13 PROCEDURE — 80061 LIPID PANEL: CPT | Performed by: FAMILY MEDICINE

## 2017-10-13 PROCEDURE — 84403 ASSAY OF TOTAL TESTOSTERONE: CPT | Performed by: FAMILY MEDICINE

## 2017-10-13 PROCEDURE — 85027 COMPLETE CBC AUTOMATED: CPT | Performed by: FAMILY MEDICINE

## 2017-10-13 PROCEDURE — 36415 COLL VENOUS BLD VENIPUNCTURE: CPT | Performed by: FAMILY MEDICINE

## 2017-10-13 PROCEDURE — 80053 COMPREHEN METABOLIC PANEL: CPT | Performed by: FAMILY MEDICINE

## 2017-10-13 PROCEDURE — 84443 ASSAY THYROID STIM HORMONE: CPT | Performed by: FAMILY MEDICINE

## 2017-10-18 LAB — TESTOST SERPL-MCNC: 320 NG/DL (ref 240–950)

## 2017-11-02 PROBLEM — F41.9 ANXIETY DISORDER, UNSPECIFIED TYPE: Chronic | Status: ACTIVE | Noted: 2017-09-20

## 2017-11-03 ENCOUNTER — OFFICE VISIT (OUTPATIENT)
Dept: SLEEP MEDICINE | Facility: CLINIC | Age: 32
End: 2017-11-03
Attending: FAMILY MEDICINE
Payer: COMMERCIAL

## 2017-11-03 VITALS
BODY MASS INDEX: 31.15 KG/M2 | WEIGHT: 217.6 LBS | SYSTOLIC BLOOD PRESSURE: 137 MMHG | DIASTOLIC BLOOD PRESSURE: 85 MMHG | OXYGEN SATURATION: 95 % | HEART RATE: 66 BPM | HEIGHT: 70 IN

## 2017-11-03 DIAGNOSIS — E66.09 CLASS 1 OBESITY DUE TO EXCESS CALORIES WITH BODY MASS INDEX (BMI) OF 31.0 TO 31.9 IN ADULT, UNSPECIFIED WHETHER SERIOUS COMORBIDITY PRESENT: ICD-10-CM

## 2017-11-03 DIAGNOSIS — R06.00 DYSPNEA AND RESPIRATORY ABNORMALITY: Primary | ICD-10-CM

## 2017-11-03 DIAGNOSIS — R06.83 SNORING: ICD-10-CM

## 2017-11-03 DIAGNOSIS — Z72.820 LACK OF ADEQUATE SLEEP: ICD-10-CM

## 2017-11-03 DIAGNOSIS — R06.89 DYSPNEA AND RESPIRATORY ABNORMALITY: Primary | ICD-10-CM

## 2017-11-03 DIAGNOSIS — E66.811 CLASS 1 OBESITY DUE TO EXCESS CALORIES WITH BODY MASS INDEX (BMI) OF 31.0 TO 31.9 IN ADULT, UNSPECIFIED WHETHER SERIOUS COMORBIDITY PRESENT: ICD-10-CM

## 2017-11-03 DIAGNOSIS — R06.81 APNEIC EPISODE: ICD-10-CM

## 2017-11-03 DIAGNOSIS — G47.30 SLEEP APNEA, UNSPECIFIED TYPE: ICD-10-CM

## 2017-11-03 DIAGNOSIS — R53.83 OTHER FATIGUE: ICD-10-CM

## 2017-11-03 PROCEDURE — 99204 OFFICE O/P NEW MOD 45 MIN: CPT | Performed by: PHYSICIAN ASSISTANT

## 2017-11-03 NOTE — PATIENT INSTRUCTIONS
Your BMI is Body mass index is 31.22 kg/(m^2).  Weight management is a personal decision.  If you are interested in exploring weight loss strategies, the following discussion covers the approaches that may be successful. Body mass index (BMI) is one way to tell whether you are at a healthy weight, overweight, or obese. It measures your weight in relation to your height.  A BMI of 18.5 to 24.9 is in the healthy range. A person with a BMI of 25 to 29.9 is considered overweight, and someone with a BMI of 30 or greater is considered obese. More than two-thirds of [place] adults are considered overweight or obese.  Being overweight or obese increases the risk for further weight gain. Excess weight may lead to heart disease and diabetes.  Creating and following plans for healthy eating and physical activity may help you improve your health.  Weight control is part of healthy lifestyle and includes exercise, emotional health, and healthy eating habits. Careful eating habits lifelong are the mainstay of weight control. Though there are significant health benefits from weight loss, long-term weight loss with diet alone may be very difficult to achieve- studies show long-term success with dietary management in less than 10% of people. Attaining a healthy weight may be especially difficult to achieve in those with severe obesity. In some cases, medications, devices and surgical management might be considered.  What can you do?  If you are overweight or obese and are interested in methods for weight loss, you should discuss this with your provider.     Consider reducing daily calorie intake by 500 calories.     Keep a food journal.     Avoiding skipping meals, consider cutting portions instead.    Diet combined with exercise helps maintain muscle while optimizing fat loss. Strength training is particularly important for building and maintaining muscle mass. Exercise helps reduce stress, increase energy, and improves fitness.  "Increasing exercise without diet control, however, may not burn enough calories to loose weight.       Start walking three days a week 10-20 minutes at a time    Work towards walking thirty minutes five days a week     Eventually, increase the speed of your walking for 1-2 minutes at time    In addition, we recommend that you review healthy lifestyles and methods for weight loss available through the National Institutes of Health patient information sites:  http://win.niddk.nih.gov/publications/index.htm    And look into health and wellness programs that may be available through your health insurance provider, employer, local community center, or [employer].    Weight management plan: Patient was referred to their PCP to discuss a diet and exercise plan.    Provider : Deborah Nogueira  Contact Information: [facility] [phone]    [place]:  1. What is Obstructive Sleep Apnea (GRISELDA)? GRISELDA is the most common type of sleep apnea. Apnea literally means, \"without breath.\" It is characterized by repetitive pauses in breathing, despite continued effort to breathe, and is usually associated with a reduction in blood oxygen saturation. Apneas can last 10 to over 60 seconds. It is caused by narrowing or collapse of the upper airway as muscles relax during sleep. A number of things can make apnea worse, including: sleeping on your back, having alcohol in the evening, smoking, asthma, allergies, nasal congestion, and weight gain.  2. What are the consequences of GRISELDA? Symptoms include: daytime sleepiness- possibly increasing the risk of falling asleep while driving, unrefreshing/restless sleep, snoring, insomnia, waking frequently to urinate, waking with heartburn or reflux, reduced concentration and memory, and morning headaches. Other health consequences may include development of high blood pressure. Untreated GRISELDA also can contribute to heart disease, stroke and diabetes.   3. What are the treatment " options? In most situations, sleep apnea is a lifelong disease that must be managed with daily therapy. Continuous Positive Airway (CPAP) is the most reliable treatment. A mouthguard to hold your jaw forward is usually the next most reliable option. Other options include postioning devices (to keep you off your back), nasal valves, tongue retaining device, weight loss, surgery. There is more detail about these options toward the end of this document.  4. What are the most important things to remember about using CPAP?     WHERE CAN I FIND MORE INFORMATION?    American Academy of Sleep Medicine Patient information on sleep disorders:  http://yoursleep.aasmnet.org    CPAP-  WHY AND HOW?                                 Continuous positive airway pressure, or CPAP, is the most effective treatment for obstructive sleep apnea. It works by using air pressure to hold your throat open. A decision to use CPAP is a major step forward in the pursuit of a healthier life. The successful use of CPAP will help you breathe easier, sleep better and live healthier. Using CPAP can be a positive experience if you keep these rosen points in mind:  1. Commitment  CPAP is not a quick fix for your problem. It involves a long-term commitment to improve your sleep and your health.    2. Communication  Stay in close communication with both your sleep doctor and your CPAP supplier. Ask lots of questions and seek help when you need it.    3. Consistency  Use CPAP all night, every night and for every nap. You will receive the maximum health benefits from CPAP when you use it every time that you sleep. This will also make it easier for your body to adjust to the treatment.    4. Correction  The first machine and mask that you try may not be the best ones for you. Work with your sleep doctor and your CPAP supplier to make corrections to your equipment selection. Ask about trying a different type of machine or mask if you have ongoing problems. Make  "sure that your mask is a good fit and learn to use your equipment properly.    5. Challenge  Tell a family member or close friend to ask you each morning if you used your CPAP the previous night. Have someone to challenge you to give it your best effort.    6. Connection   Your adjustment to CPAP will be easier if you are able to connect with others who use the same treatment. Ask your sleep doctor if there is a support group in your area for people who have sleep apnea, or look for one on the Internet.  7. Comfort   Increase your level of comfort by using a saline spray, decongestant or heated humidifier if CPAP irritates your nose, mouth or throat. Use your unit's \"ramp\" setting to slowly get used to the air pressure level. There may be soft pads you can buy that will fit over your mask straps. Look on www.CPAP.com for accessories such as these straps, a pillow contoured for side-sleeping with CPAP, longer hoses, hose covers to reduce condensation, or stands to keep the hose out of your way.                   8. Cleaning   Clean your mask, tubing and headgear on a regular basis. Put this time in your schedule so that you don't forget to do it. Check and replace the filters for your CPAP unit and humidifier.    9. Completion   Although you are never finished with CPAP therapy, you should reward yourself by celebrating the completion of your first month of treatment. Expect this first month to be your hardest period of adjustment. It will involve some trial and error as you find the machine, mask and pressure settings that are right for you.    10. Continuation  After your first month of treatment, continue to make a daily commitment to use your CPAP all night, every night and for every nap.    CPAP-Tips to starting with success:  Begin using your CPAP for short periods of time during the day while you watch TV or read. This eliminates the pressure of trying to fall asleep with it when it is still a new " sensation.    Use CPAP every night and for every nap. Using it less often reduces the health benefits and makes it harder for your body to get used to it.    Newer CPAP models are virtually silent; however, if you find the sound of your CPAP machine to be bothersome, place the unit under your bed to dampen the sound.     Make small adjustments to your mask, tubing, straps and headgear until you get the right fit. Tightening the mask may actually worsen the leak.  If it leaves significant marks on your face or irritates the bridge of your nose, it may not be the best mask for you.  Speak with the person who supplied the mask and consider trying other masks. Insurances will allow you to try different masks during the first month of starting CPAP.  Insurance also covers a new mask, hose and filter about every 3-6 months.    Use a saline nasal spray to ease mild nasal congestion. Neti-Pot or saline nasal rinses may also help. Nasal gel sprays can help reduce nasal dryness.  Biotene mouthwash can be helpful to protect your teeth if you experience frequent dry mouth.  Dry mouth may be a sign of air escaping out of your mouth or out of the mask in the case of a full face mask.  Speak with your provider if you expect that is the case.     Take a nasal decongestant to relieve more severe nasal or sinus congestion.  Do not use Afrin (oxymetazoline) nasal spray more than 3 days in a row.  Speak with your sleep doctor if your nasal congestion is chronic.    Use a heated humidifier that fits your CPAP model to enhance your breathing comfort. Adjust the heat setting up if you get a dry nose or throat, down if you get condensation in the hose or mask.  Position the CPAP lower than you so that any condensation in the hose drains back into the machine rather than towards the mask.    Try a system that uses nasal pillows if traditional masks give you problems.    Clean your mask, tubing and headgear once a week. Make sure the  equipment dries fully.    Regularly check and replace the filters for your CPAP unit and humidifier.    Work closely with your sleep doctor and your CPAP supplier to make sure that you have the machine, mask and air pressure setting that works best for you.    BESIDES CPAP, WHAT OTHER THERAPIES ARE THERE?    Postioning devices if you only have the snoring or apnea while on your back    Dental devices if your condition is mild    Nasal valves may be effective though experience is limited    Weight loss if you are overweight    Surgery in limited cases where devices are not acceptable or there are problems with structures in the nose and throat  If treated with one of these alternative options, further evaluation is necessary to ensure that the therapy is effective. This may require some form of repeat testing.    Healthy Lifestyle:  Healthy diet, exercise and limit alcohol: Not only will excessive alcohol increase your weight over time, but it irritates the throat tissues and make them swell, shrinking the airway and causing snoring. Drinking alcohol should be limited and stopped within 3-4 hours before going to bed.   Stop smoking: (Red swollen throat, heat, nicotine), also irritates and swells the airway, among numerous other negative health consequences.  Positioning Device  This example shows a pillow that straps around the waist. It may be appropriate for those whose sleep study shows milder sleep apnea that occurs primarily when lying flat on one's back. Preliminary studies have shown benefit but effectiveness at home should be verified.                      Oral Appliance  These are examples of two of many custom-made devices that are more likely to work in mild sleep apnea                                                Oral appliances are dental mouth pieces that fit very much like a sports mouth guards or removable orthodontic retainers. They are used to treat snoring and obstructive sleep apnea . The device  prevents the airway from collapsing by either holding the tongue or supporting the jaw in a forward position. Since oral appliances are non-invasive and easy to use, they may be considered as an early treatment option. Oral appliance therapy (OAT) involves the customization, selection, fabrication, fitting, adjustments and long-term follow-up care of specially designed oral devices, worn during sleep, which reposition the lower jaw and tongue base forward to maintain an open airway.  Custom made oral appliances are proven to be more effective than over-the-counter devices. Therefore, the over-the-counter devices are recommended not to be used as a screening tool nor as a therapeutic option.     Who gets a dental device?  Oral appliance therapy can be used as an alternative to CPAP therapy for the treatment of mild to moderate sleep apnea and for those patients who prefer OAT to CPAP. Oral appliance therapy is a first line therapy for the treatment of primary snoring. Additionally, OAT is an option for those that cannot tolerate CPAP as therapy or who have experienced insufficient surgical results.                 Possible side effects?  Frequent but minor side effects include: excessive salivation, dry mouth, discomfort of teeth and jaw and temporary changes in the patient s bite.  Potential complications include: jaw pain, permanent occlusal changes and TMJ symptoms.  The above mentioned side effects and complications can be recognized and managed by dentists trained in dental sleep medicine.   Finding a dentist that practices dental sleep medicine  Specific training is available through the American Academy of Dental Sleep Medicine for dentists interested in working in the field of sleep. To find a dentist who is educated in the field of sleep and the use of oral appliances, near you, visit the Web site of the American Academy of Dental Sleep Medicine; also see  http://www.accpstorage.org/newOrganization/patients/oralAppliances.pdf   To search for a dentist certified in these practices:  Http://aadsm.org/FindADentist.aspx?1  Http://www.accpstorage.org/newOrganization/patients/oralAppliances.pdf    Weight Loss:    Some patients may experience reduction or elimination of sleep apnea with weight loss.  Though there are significant health benefits from weight loss, long-term weight loss is very difficult to achieve- studies show success with dietary management in less that 10% of people.     If you are interested in dietary weight loss, you should review the options discussed at the National Institutes of Health patient information site:     Http:/www.health.nih.gov/topic/WeightLossDieting    Bariatric programs offer counseling in all methods of weight loss:    Http:/www.[facility].org/Specialties/WeightLossSurgeryandMedicalMgmt/htm    Surgery:  There are a number of surgeries that have been attempted to treat apnea. In general, surgical options are usually reserved for cases in which there is a physical abnormality contributing to obstruction or other treatment options are ineffective or not tolerated. Most surgical options are either unreliable or quite invasive. One of the more common procedures is:  Uvulopalatopharyngoplasty: In this procedure, the uvula (the finger-like tissue that hangs in the back of the throat), part of the soft palate (the tissue that the uvula is attached to), and sometimes the tonsils or adenoids are removed. The efficacy of this surgery is around 30-50% .  After surgery, complications may include:  Sleepiness and sleep apnea related to post-surgery medication   Swelling, infection and bleeding   A sore throat and/or difficulty swallowing   Drainage of secretions into the nose and a nasal quality to the voice. English language speech does not seem to be affected by this surgery.   Narrowing of the airway in the nose and throat (hence  "constricting breathing) snoring and even iatrogenically caused sleep apnea. By cutting the tissues, excess scar tissue can \"tighten\" the airway and make it even smaller than it was before UPPP.  Patients who have had the uvula removed will become unable to correctly speak [place] or any other language that has a uvular 'r' phoneme.    Surgeries to help resolve nasal congestion may help reduce the severity of apnea slightly. Nasal congestion does not cause apnea on its own, so these surgeries are usually not performed just for GRISELDA.  They may be worth considering if the nasal congestion is significantly bothersome independent of apnea.      "

## 2017-11-03 NOTE — PROGRESS NOTES
Sleep Consultation:    Date on this visit: 11/3/2017    Prabhjot Artis is sent by Haider Bird for a sleep consultation regarding sleep disordered breathing.    Primary Physician: Haider Bird     Chief Complaint   Patient presents with     Sleep Problem     Consult - sleep apnea       Prabhjot goes to sleep at 10 PM during the week. He wakes up at 5:00 AM with an alarm. He falls asleep in 10 minutes.  Prabhjot denies difficulty falling asleep.  He wakes up 0-4 times a night for 5-30 minutes before falling back to sleep.  Prabhjot wakes up due to kids.  On weekends, Prabhjot goes to sleep at 10:00 PM.  He wakes up at 6:00 AM without an alarm. He falls asleep in 10 minutes.  Patient gets an average of 6 hours of sleep per night. He does not feel refreshed.    Patient does not use electronics in bed, watch TV in bed and read in bed.     Prabhjot does not do shift work.      Prabhjot does snore every night and snoring is very loud. Patient does have a regular bed partner. There is report of snoring and gasping arousals.  He does not have witnessed apneas. They frequently sleep separately.  Patient sleeps on his side. He has occasional morning headaches(2/week) and frequent morning dry mouth, denies morning confusion and restless legs. Prabhjot denies any bruxism, sleep walking, sleep talking, dream enactment, sleep paralysis, cataplexy and hypnogogic/hypnopompic hallucinations.    Prabhjot has difficulty breathing through his nose, denies claustrophobia and reflux at night.      Prabhjot has gained 15 pounds in the last 3 years.  Patient describes themself as a morning person.  He would prefer to go to sleep at 9:00 PM and wake up at 5:00 AM.  Patient's [place] Sleepiness score 9/24 consistent with excessive  daytime sleepiness.  He has fatigue.    Prabhjot does not take naps. He takes some inadvertant naps.  He denies falling asleep while driving.  Patient was counseled on the importance of driving while alert, to pull over if drowsy, or  nap before getting into the vehicle if sleepy.  He uses 2 sodas/day. Last caffeine intake is usually before noon.    Allergies:    No Known Allergies    Medications:    Current Outpatient Prescriptions   Medication Sig Dispense Refill     albuterol (PROAIR HFA/PROVENTIL HFA/VENTOLIN HFA) 108 (90 BASE) MCG/ACT Inhaler Inhale 2 puffs into the lungs every 6 hours as needed for shortness of breath / dyspnea or wheezing 3 Inhaler 1     fluticasone-salmeterol (ADVAIR) 250-50 MCG/DOSE diskus inhaler Inhale 1 puff into the lungs 2 times daily 3 Inhaler 1     Triamcinolone Acetonide (NASACORT AQ NA)        LORazepam (ATIVAN) 0.5 MG tablet Take 1 tablet (0.5 mg) by mouth every 12 hours as needed for anxiety (Patient not taking: Reported on 11/3/2017) 30 tablet 0       Problem List:  Patient Active Problem List    Diagnosis Date Noted     Anxiety disorder, unspecified type 09/20/2017     Priority: Medium     Overweight 09/20/2017     Priority: Medium     Hyperglycemia 02/16/2015     Priority: Medium     CARDIOVASCULAR SCREENING; LDL GOAL LESS THAN 160 02/04/2015     Priority: Medium     Moderate persistent asthma 02/04/2015     Priority: Medium     Hyperlipidemia with target LDL less than 160      Priority: Medium     Diagnosis updated by automated process. Provider to review and confirm.       Social anxiety disorder      Priority: Medium     Class 1 obesity due to excess calories with body mass index (BMI) of 31.0 to 31.9 in adult, unspecified whether serious comorbidity present 11/03/2017     Priority: Low        Past Medical/Surgical History:  Past Medical History:   Diagnosis Date     Insomnia 2/4/2015     Mild intermittent asthma     Seasonal with pollen exposure--otherwise with exercise only     Social anxiety disorder      Tobacco use disorder 3/30/2015     Past Surgical History:   Procedure Laterality Date     NO HISTORY OF SURGERY         Social History:  Social History     Social History     Marital status: Single      Spouse name: N/A     Number of children: N/A     Years of education: N/A     Occupational History     [employer]      Social History Main Topics     Smoking status: Former Smoker     Packs/day: 0.25     Years: 10.00     Types: Cigarettes     Smokeless tobacco: Never Used      Comment: 3-4 cigarettes per day     Alcohol use 10.5 oz/week     21 Standard drinks or equivalent per week     Drug use: No     Sexual activity: Yes     Partners: Female     Other Topics Concern     Parent/Sibling W/ Cabg, Mi Or Angioplasty Before 65f 55m? No     Social History Narrative       Family History:  Family History   Problem Relation Age of Onset     Prostate Cancer Paternal Grandfather      In 50's     DIABETES No family hx of      Coronary Artery Disease No family hx of      Hypertension No family hx of      Hyperlipidemia No family hx of      Breast Cancer No family hx of      Cancer - colorectal No family hx of      Ovarian Cancer No family hx of      Depression/Anxiety No family hx of      CEREBROVASCULAR DISEASE No family hx of      Anesthesia Reaction No family hx of      Thyroid Disease No family hx of      OSTEOPOROSIS No family hx of      Known Genetic Syndrome No family hx of      Asthma No family hx of      Chemical Addiction No family hx of        Review of Systems:  A complete review of systems reviewed by me is negative with the exeption of what has been mentioned in the history of present illness.  CONSTITUTIONAL: NEGATIVE for weight gain/loss, fever, chills, sweats or night sweats.  EYES: NEGATIVE for changes in vision, blind spots, double vision.  ENT: NEGATIVE for ear pain, sore throat, sinus pain, post-nasal drip, runny nose, bloody nose  CARDIAC: NEGATIVE for fast heartbeats or fluttering in chest, chest pain or pressure, breathlessness when lying flat, swollen legs or swollen feet.  NEUROLOGIC: NEGATIVE headaches, weakness or numbness in the arms or legs.  DERMATOLOGIC: NEGATIVE for  "rashes, new moles or change in mole(s)  PULMONARY: NEGATIVE SOB at rest, SOB with activity, dry cough, productive cough, coughing up blood, wheezing or whistling when breathing.    GASTROINTESTINAL: NEGATIVE for nausea or vomitting, loose or watery stools, fat or grease in stools, constipation, abdominal pain, bowel movements black in color or blood noted.  GENITOURINARY: NEGATIVE for pain during urination, blood in urine, urinating more frequently than usual, irregular menstrual periods.  MUSCULOSKELETAL: NEGATIVE for muscle pain, bone or joint pain, swollen joints.  ENDOCRINE: NEGATIVE for increased thirst or urination, diabetes.  LYMPHATIC: NEGATIVE for swollen lymph nodes, lumps or bumps in the breasts or nipple discharge.    Physical Examination:  Vitals: /85  Pulse 66  Ht 1.778 m (5' 10\")  Wt 98.7 kg (217 lb 9.6 oz)  SpO2 95%  BMI 31.22 kg/m2  BMI= Body mass index is 31.22 kg/(m^2).    Neck Cir (cm): 44 cm    [place] Total Score 11/3/2017   Total score - [place] 9       GENERAL APPEARANCE: alert and no distress  EYES: Eyes grossly normal to inspection, PERRL and conjunctivae and sclerae normal  HENT: ear canals and TM's normal, nose and mouth without ulcers or lesions, oropharynx crowded and tongue base enlarged  NECK: no asymmetry, masses, or scars  RESP: lungs clear to auscultation - no rales, rhonchi or wheezes  CV: regular rates and rhythm and normal S1 S2, no S3 or S4  MS: extremities normal- no gross deformities noted  NEURO: Normal strength and tone, mentation intact and speech normal  PSYCH: mentation appears normal and affect normal/bright  Mallampati Class: IV.  Tonsillar Stage:     Last Basic Metabolic Panel:  Lab Results   Component Value Date     10/13/2017      Lab Results   Component Value Date    POTASSIUM 4.6 10/13/2017     Lab Results   Component Value Date    CHLORIDE 102 10/13/2017     Lab Results   Component Value Date    KARTIK 8.8 10/13/2017     Lab Results   Component " Value Date    CO2 28 10/13/2017     Lab Results   Component Value Date    BUN 15 10/13/2017     Lab Results   Component Value Date    CR 1.10 10/13/2017     Lab Results   Component Value Date     10/13/2017     TSH   Date Value Ref Range Status   10/13/2017 1.57 0.40 - 4.00 mU/L Final   ]    Impression:  Patient has features and risk factors for possible obstructive sleep apnea including: loud snoring, witnessed apnea, non-refreshing sleep, daytime fatigue/sleepiness(ESS9), crowded oropharynx and neck circumference of 17.5 inches.  The STOP-BANG score is 5/8.     Plan:    1. Schedule a Home Sleep Apnea Testing to evaluate for high probability obstructive sleep apnea.    2. Advised him against drowsy driving.    3. Recommend weight management.       Literature provided regarding sleep apnea and sleep hygiene.      He will follow up with me in approximately one day after his sleep study has been competed to review the results and discuss plan of care.       Home Sleep Apnea Testing reviewed.  Obstructive sleep apnea reviewed.  Complications of untreated sleep apnea were reviewed.    Deborah Nogueira PA-C    CC: Haider Bird

## 2017-11-03 NOTE — NURSING NOTE
"Chief Complaint   Patient presents with     Sleep Problem     Consult - sleep apnea       Initial /85  Pulse 66  Ht 1.778 m (5' 10\")  Wt 98.7 kg (217 lb 9.6 oz)  SpO2 95%  BMI 31.22 kg/m2 Estimated body mass index is 31.22 kg/(m^2) as calculated from the following:    Height as of this encounter: 1.778 m (5' 10\").    Weight as of this encounter: 98.7 kg (217 lb 9.6 oz).  Medication Reconciliation: complete   Neck Cir (cm): 44 cm (11/3/2017  1:24 PM)  ESS 9    Luisa Vázquez CMA      "

## 2017-11-03 NOTE — MR AVS SNAPSHOT
After Visit Summary   11/3/2017    Prabhjot Artis    MRN: [id number]           Patient Information     Date Of Birth          1985        Visit Information        Provider Department      11/3/2017 1:30 PM Deborah Nogueira PA [facility]        Today's Diagnoses     Dyspnea and respiratory abnormality    -  1    Other fatigue        Apneic episode        Snoring        Class 1 obesity due to excess calories with body mass index (BMI) of 31.0 to 31.9 in adult, unspecified whether serious comorbidity present        Lack of adequate sleep        Sleep apnea, unspecified type          Care Instructions      Your BMI is Body mass index is 31.22 kg/(m^2).  Weight management is a personal decision.  If you are interested in exploring weight loss strategies, the following discussion covers the approaches that may be successful. Body mass index (BMI) is one way to tell whether you are at a healthy weight, overweight, or obese. It measures your weight in relation to your height.  A BMI of 18.5 to 24.9 is in the healthy range. A person with a BMI of 25 to 29.9 is considered overweight, and someone with a BMI of 30 or greater is considered obese. More than two-thirds of American adults are considered overweight or obese.  Being overweight or obese increases the risk for further weight gain. Excess weight may lead to heart disease and diabetes.  Creating and following plans for healthy eating and physical activity may help you improve your health.  Weight control is part of healthy lifestyle and includes exercise, emotional health, and healthy eating habits. Careful eating habits lifelong are the mainstay of weight control. Though there are significant health benefits from weight loss, long-term weight loss with diet alone may be very difficult to achieve- studies show long-term success with dietary management in less than 10% of people. Attaining a healthy weight may be especially difficult to  "achieve in those with severe obesity. In some cases, medications, devices and surgical management might be considered.  What can you do?  If you are overweight or obese and are interested in methods for weight loss, you should discuss this with your provider.     Consider reducing daily calorie intake by 500 calories.     Keep a food journal.     Avoiding skipping meals, consider cutting portions instead.    Diet combined with exercise helps maintain muscle while optimizing fat loss. Strength training is particularly important for building and maintaining muscle mass. Exercise helps reduce stress, increase energy, and improves fitness. Increasing exercise without diet control, however, may not burn enough calories to loose weight.       Start walking three days a week 10-20 minutes at a time    Work towards walking thirty minutes five days a week     Eventually, increase the speed of your walking for 1-2 minutes at time    In addition, we recommend that you review healthy lifestyles and methods for weight loss available through the National Institutes of Health patient information sites:  http://win.niddk.nih.gov/publications/index.htm    And look into health and wellness programs that may be available through your health insurance provider, employer, local community center, or elizabeth club.    Weight management plan: Patient was referred to their PCP to discuss a diet and exercise plan.    Provider : Deborah Nogueira  Contact Information: [facility] [phone]    [place]:  1. What is Obstructive Sleep Apnea (GRISELDA)? GRISELDA is the most common type of sleep apnea. Apnea literally means, \"without breath.\" It is characterized by repetitive pauses in breathing, despite continued effort to breathe, and is usually associated with a reduction in blood oxygen saturation. Apneas can last 10 to over 60 seconds. It is caused by narrowing or collapse of the upper airway as muscles relax during sleep. A " number of things can make apnea worse, including: sleeping on your back, having alcohol in the evening, smoking, asthma, allergies, nasal congestion, and weight gain.  2. What are the consequences of GRISELDA? Symptoms include: daytime sleepiness- possibly increasing the risk of falling asleep while driving, unrefreshing/restless sleep, snoring, insomnia, waking frequently to urinate, waking with heartburn or reflux, reduced concentration and memory, and morning headaches. Other health consequences may include development of high blood pressure. Untreated GRISELDA also can contribute to heart disease, stroke and diabetes.   3. What are the treatment options? In most situations, sleep apnea is a lifelong disease that must be managed with daily therapy. Continuous Positive Airway (CPAP) is the most reliable treatment. A mouthguard to hold your jaw forward is usually the next most reliable option. Other options include postioning devices (to keep you off your back), nasal valves, tongue retaining device, weight loss, surgery. There is more detail about these options toward the end of this document.  4. What are the most important things to remember about using CPAP?     WHERE CAN I FIND MORE INFORMATION?    American Academy of Sleep Medicine Patient information on sleep disorders:  http://yoursleep.aasmnet.org    CPAP-  WHY AND HOW?                                 Continuous positive airway pressure, or CPAP, is the most effective treatment for obstructive sleep apnea. It works by using air pressure to hold your throat open. A decision to use CPAP is a major step forward in the pursuit of a healthier life. The successful use of CPAP will help you breathe easier, sleep better and live healthier. Using CPAP can be a positive experience if you keep these rosen points in mind:  1. Commitment  CPAP is not a quick fix for your problem. It involves a long-term commitment to improve your sleep and your health.    2. Communication  Stay in  "close communication with both your sleep doctor and your CPAP supplier. Ask lots of questions and seek help when you need it.    3. Consistency  Use CPAP all night, every night and for every nap. You will receive the maximum health benefits from CPAP when you use it every time that you sleep. This will also make it easier for your body to adjust to the treatment.    4. Correction  The first machine and mask that you try may not be the best ones for you. Work with your sleep doctor and your CPAP supplier to make corrections to your equipment selection. Ask about trying a different type of machine or mask if you have ongoing problems. Make sure that your mask is a good fit and learn to use your equipment properly.    5. Challenge  Tell a family member or close friend to ask you each morning if you used your CPAP the previous night. Have someone to challenge you to give it your best effort.    6. Connection   Your adjustment to CPAP will be easier if you are able to connect with others who use the same treatment. Ask your sleep doctor if there is a support group in your area for people who have sleep apnea, or look for one on the Internet.  7. Comfort   Increase your level of comfort by using a saline spray, decongestant or heated humidifier if CPAP irritates your nose, mouth or throat. Use your unit's \"ramp\" setting to slowly get used to the air pressure level. There may be soft pads you can buy that will fit over your mask straps. Look on www.CPAP.com for accessories such as these straps, a pillow contoured for side-sleeping with CPAP, longer hoses, hose covers to reduce condensation, or stands to keep the hose out of your way.                   8. Cleaning   Clean your mask, tubing and headgear on a regular basis. Put this time in your schedule so that you don't forget to do it. Check and replace the filters for your CPAP unit and humidifier.    9. Completion   Although you are never finished with CPAP therapy, you " should reward yourself by celebrating the completion of your first month of treatment. Expect this first month to be your hardest period of adjustment. It will involve some trial and error as you find the machine, mask and pressure settings that are right for you.    10. Continuation  After your first month of treatment, continue to make a daily commitment to use your CPAP all night, every night and for every nap.    CPAP-Tips to starting with success:  Begin using your CPAP for short periods of time during the day while you watch TV or read. This eliminates the pressure of trying to fall asleep with it when it is still a new sensation.    Use CPAP every night and for every nap. Using it less often reduces the health benefits and makes it harder for your body to get used to it.    Newer CPAP models are virtually silent; however, if you find the sound of your CPAP machine to be bothersome, place the unit under your bed to dampen the sound.     Make small adjustments to your mask, tubing, straps and headgear until you get the right fit. Tightening the mask may actually worsen the leak.  If it leaves significant marks on your face or irritates the bridge of your nose, it may not be the best mask for you.  Speak with the person who supplied the mask and consider trying other masks. Insurances will allow you to try different masks during the first month of starting CPAP.  Insurance also covers a new mask, hose and filter about every 3-6 months.    Use a saline nasal spray to ease mild nasal congestion. Neti-Pot or saline nasal rinses may also help. Nasal gel sprays can help reduce nasal dryness.  Biotene mouthwash can be helpful to protect your teeth if you experience frequent dry mouth.  Dry mouth may be a sign of air escaping out of your mouth or out of the mask in the case of a full face mask.  Speak with your provider if you expect that is the case.     Take a nasal decongestant to relieve more severe nasal or sinus  congestion.  Do not use Afrin (oxymetazoline) nasal spray more than 3 days in a row.  Speak with your sleep doctor if your nasal congestion is chronic.    Use a heated humidifier that fits your CPAP model to enhance your breathing comfort. Adjust the heat setting up if you get a dry nose or throat, down if you get condensation in the hose or mask.  Position the CPAP lower than you so that any condensation in the hose drains back into the machine rather than towards the mask.    Try a system that uses nasal pillows if traditional masks give you problems.    Clean your mask, tubing and headgear once a week. Make sure the equipment dries fully.    Regularly check and replace the filters for your CPAP unit and humidifier.    Work closely with your sleep doctor and your CPAP supplier to make sure that you have the machine, mask and air pressure setting that works best for you.    BESIDES CPAP, WHAT OTHER THERAPIES ARE THERE?    Postioning devices if you only have the snoring or apnea while on your back    Dental devices if your condition is mild    Nasal valves may be effective though experience is limited    Weight loss if you are overweight    Surgery in limited cases where devices are not acceptable or there are problems with structures in the nose and throat  If treated with one of these alternative options, further evaluation is necessary to ensure that the therapy is effective. This may require some form of repeat testing.    Healthy Lifestyle:  Healthy diet, exercise and limit alcohol: Not only will excessive alcohol increase your weight over time, but it irritates the throat tissues and make them swell, shrinking the airway and causing snoring. Drinking alcohol should be limited and stopped within 3-4 hours before going to bed.   Stop smoking: (Red swollen throat, heat, nicotine), also irritates and swells the airway, among numerous other negative health consequences.  Positioning Device  This example shows a  pillow that straps around the waist. It may be appropriate for those whose sleep study shows milder sleep apnea that occurs primarily when lying flat on one's back. Preliminary studies have shown benefit but effectiveness at home should be verified.                      Oral Appliance  These are examples of two of many custom-made devices that are more likely to work in mild sleep apnea                                                Oral appliances are dental mouth pieces that fit very much like a sports mouth guards or removable orthodontic retainers. They are used to treat snoring and obstructive sleep apnea . The device prevents the airway from collapsing by either holding the tongue or supporting the jaw in a forward position. Since oral appliances are non-invasive and easy to use, they may be considered as an early treatment option. Oral appliance therapy (OAT) involves the customization, selection, fabrication, fitting, adjustments and long-term follow-up care of specially designed oral devices, worn during sleep, which reposition the lower jaw and tongue base forward to maintain an open airway.  Custom made oral appliances are proven to be more effective than over-the-counter devices. Therefore, the over-the-counter devices are recommended not to be used as a screening tool nor as a therapeutic option.     Who gets a dental device?  Oral appliance therapy can be used as an alternative to CPAP therapy for the treatment of mild to moderate sleep apnea and for those patients who prefer OAT to CPAP. Oral appliance therapy is a first line therapy for the treatment of primary snoring. Additionally, OAT is an option for those that cannot tolerate CPAP as therapy or who have experienced insufficient surgical results.                 Possible side effects?  Frequent but minor side effects include: excessive salivation, dry mouth, discomfort of teeth and jaw and temporary changes in the patient s bite.  Potential  complications include: jaw pain, permanent occlusal changes and TMJ symptoms.  The above mentioned side effects and complications can be recognized and managed by dentists trained in dental sleep medicine.   Finding a dentist that practices dental sleep medicine  Specific training is available through the American Academy of Dental Sleep Medicine for dentists interested in working in the field of sleep. To find a dentist who is educated in the field of sleep and the use of oral appliances, near you, visit the Web site of the American Academy of Dental Sleep Medicine; also see http://www.accpstorage.org/newOrganization/patients/oralAppliances.pdf   To search for a dentist certified in these practices:  Http://aadsm.org/FindADentist.aspx?1  Http://www.accpstorage.org/newOrganization/patients/oralAppliances.pdf    Weight Loss:    Some patients may experience reduction or elimination of sleep apnea with weight loss.  Though there are significant health benefits from weight loss, long-term weight loss is very difficult to achieve- studies show success with dietary management in less that 10% of people.     If you are interested in dietary weight loss, you should review the options discussed at the National Institutes of Health patient information site:     Http:/www.health.nih.gov/topic/WeightLossDieting    Bariatric programs offer counseling in all methods of weight loss:    Http:/www.uofedicalcenter.org/Specialties/WeightLossSurgeryandMedicalMgmt/htm    Surgery:  There are a number of surgeries that have been attempted to treat apnea. In general, surgical options are usually reserved for cases in which there is a physical abnormality contributing to obstruction or other treatment options are ineffective or not tolerated. Most surgical options are either unreliable or quite invasive. One of the more common procedures is:  Uvulopalatopharyngoplasty: In this procedure, the uvula (the finger-like tissue that hangs in the  "back of the throat), part of the soft palate (the tissue that the uvula is attached to), and sometimes the tonsils or adenoids are removed. The efficacy of this surgery is around 30-50% .  After surgery, complications may include:  Sleepiness and sleep apnea related to post-surgery medication   Swelling, infection and bleeding   A sore throat and/or difficulty swallowing   Drainage of secretions into the nose and a nasal quality to the voice. English language speech does not seem to be affected by this surgery.   Narrowing of the airway in the nose and throat (hence constricting breathing) snoring and even iatrogenically caused sleep apnea. By cutting the tissues, excess scar tissue can \"tighten\" the airway and make it even smaller than it was before UPPP.  Patients who have had the uvula removed will become unable to correctly speak Bel[place]ian or any other language that has a uvular 'r' phoneme.    Surgeries to help resolve nasal congestion may help reduce the severity of apnea slightly. Nasal congestion does not cause apnea on its own, so these surgeries are usually not performed just for GRISELDA.  They may be worth considering if the nasal congestion is significantly bothersome independent of apnea.              Follow-ups after your visit        Your next 10 appointments already scheduled     Nov 16, 2017  7:30 AM CST   HST  with RAH BED 5   [facility] ([facility])    [address]  [place] [address]   [phone]            Nov 17, 2017  1:30 PM CST   HST Drop Off with [facility] DME   [facility] ([facility])    [address]  [place] [address]   [phone]            Nov 17, 2017  2:00 PM CST   Return Sleep Patient with OBDULIO Grey   [facility] ([facility])    [address]  [place] [facility] [place] [address] " "  [phone]              Future tests that were ordered for you today     Open Future Orders        Priority Expected Expires Ordered    HST-Home Sleep Apnea Test Routine  2018 11/3/2017            Who to contact     If you have questions or need follow up information about today's clinic visit or your schedule please contact [facility] directly at [phone].  Normal or non-critical lab and imaging results will be communicated to you by MyChart, letter or phone within 4 business days after the clinic has received the results. If you do not hear from us within 7 days, please contact the clinic through PodPonicshart or phone. If you have a critical or abnormal lab result, we will notify you by phone as soon as possible.  Submit refill requests through Measurabl or call your pharmacy and they will forward the refill request to us. Please allow 3 business days for your refill to be completed.          Additional Information About Your Visit        PodPonicsharXumii Information     Measurabl lets you send messages to your doctor, view your test results, renew your prescriptions, schedule appointments and more. To sign up, go to [email or web address] . Click on \"Log in\" on the left side of the screen, which will take you to the Welcome page. Then click on \"Sign up Now\" on the right side of the page.     You will be asked to enter the access code listed below, as well as some personal information. Please follow the directions to create your username and password.     Your access code is: [email or web address]  Expires: 2017  5:11 PM     Your access code will  in 90 days. If you need help or a new code, please call your [facility] clinic or [phone].        Care EveryWhere ID     This is your Care EveryWhere ID. This could be used by other organizations to access your [facility] medical records  [id number]        Your Vitals Were     Pulse Height Pulse Oximetry BMI (Body Mass Index)          66 1.778 m (5' 10\") " 95% 31.22 kg/m2         Blood Pressure from Last 3 Encounters:   11/03/17 137/85   09/20/17 128/80   02/04/15 140/90    Weight from Last 3 Encounters:   11/03/17 98.7 kg (217 lb 9.6 oz)   09/20/17 99.8 kg (220 lb)   02/04/15 95.3 kg (210 lb)              We Performed the Following     SLEEP EVALUATION & MANAGEMENT REFERRAL - ADULT        Primary Care Provider Office Phone # Fax #    Haider Bird [phone] [address] [facility] [address]        Equal Access to Services     [facility]: Hadii aad valeria hadwhito Soradha, waaxda luqadaha, qaybta kaalmada adeleonyada, lissa gillette . So [facility] [phone].    ATENCIÓN: Si habla español, tiene a dubon disposición servicios gratuitos de asistencia lingüística. [facility] [phone].    We comply with applicable federal civil rights laws and [place] laws. We do not discriminate on the basis of race, color, national origin, age, disability, sex, sexual orientation, or gender identity.            Thank you!     Thank you for choosing [facility]  for your care. Our goal is always to provide you with excellent care. Hearing back from our patients is one way we can continue to improve our services. Please take a few minutes to complete the written survey that you may receive in the mail after your visit with us. Thank you!             Your Updated Medication List - Protect others around you: Learn how to safely use, store and throw away your medicines at www.disposemymeds.org.          This list is accurate as of: 11/3/17  1:57 PM.  Always use your most recent med list.                   Brand Name Dispense Instructions for use Diagnosis    albuterol 108 (90 BASE) MCG/ACT Inhaler    PROAIR HFA/PROVENTIL HFA/VENTOLIN HFA    3 Inhaler    Inhale 2 puffs into the lungs every 6 hours as needed for shortness of breath / dyspnea or wheezing        fluticasone-salmeterol 250-50 MCG/DOSE diskus inhaler    ADVAIR     3 Inhaler    Inhale 1 puff into the lungs 2 times daily    Moderate persistent asthma without complication       LORazepam 0.5 MG tablet    ATIVAN    30 tablet    Take 1 tablet (0.5 mg) by mouth every 12 hours as needed for anxiety    Anxiety disorder, unspecified type       NASACORT AQ NA

## 2017-11-16 ENCOUNTER — OFFICE VISIT (OUTPATIENT)
Dept: SLEEP MEDICINE | Facility: CLINIC | Age: 32
End: 2017-11-16
Payer: COMMERCIAL

## 2017-11-16 DIAGNOSIS — E66.811 CLASS 1 OBESITY DUE TO EXCESS CALORIES WITH BODY MASS INDEX (BMI) OF 31.0 TO 31.9 IN ADULT, UNSPECIFIED WHETHER SERIOUS COMORBIDITY PRESENT: ICD-10-CM

## 2017-11-16 DIAGNOSIS — E66.09 CLASS 1 OBESITY DUE TO EXCESS CALORIES WITH BODY MASS INDEX (BMI) OF 31.0 TO 31.9 IN ADULT, UNSPECIFIED WHETHER SERIOUS COMORBIDITY PRESENT: ICD-10-CM

## 2017-11-16 DIAGNOSIS — R53.83 OTHER FATIGUE: ICD-10-CM

## 2017-11-16 DIAGNOSIS — R06.89 DYSPNEA AND RESPIRATORY ABNORMALITY: ICD-10-CM

## 2017-11-16 DIAGNOSIS — R06.81 APNEIC EPISODE: ICD-10-CM

## 2017-11-16 DIAGNOSIS — R06.00 DYSPNEA AND RESPIRATORY ABNORMALITY: ICD-10-CM

## 2017-11-16 DIAGNOSIS — G47.30 SLEEP APNEA, UNSPECIFIED TYPE: ICD-10-CM

## 2017-11-16 DIAGNOSIS — Z72.820 LACK OF ADEQUATE SLEEP: ICD-10-CM

## 2017-11-16 PROCEDURE — G0399 HOME SLEEP TEST/TYPE 3 PORTA: HCPCS | Performed by: INTERNAL MEDICINE

## 2017-11-16 NOTE — MR AVS SNAPSHOT
After Visit Summary   11/16/2017    Prabhjot Artis    MRN: [id number]           Patient Information     Date Of Birth          1985        Visit Information        Provider Department      11/16/2017 7:30 AM BK BED 5 [facility]        Today's Diagnoses     Other fatigue        Apneic episode        Class 1 obesity due to excess calories with body mass index (BMI) of 31.0 to 31.9 in adult, unspecified whether serious comorbidity present        Lack of adequate sleep        Sleep apnea, unspecified type        Dyspnea and respiratory abnormality           Follow-ups after your visit        Your next 10 appointments already scheduled     Nov 17, 2017  1:30 PM CST   HST Drop Off with BK SC DME   [facility] ([facility])    [address]  [facility] [id number] [phone]            Nov 17, 2017  2:00 PM CST   Return Sleep Patient with OBDULIO Grey   [facility] ([facility])    [address]  [facility] [id number] [phone]              Who to contact     If you have questions or need follow up information about today's clinic visit or your schedule please contact [facility] directly at [phone].  Normal or non-critical lab and imaging results will be communicated to you by MyChart, letter or phone within 4 business days after the clinic has received the results. If you do not hear from us within 7 days, please contact the clinic through MyChart or phone. If you have a critical or abnormal lab result, we will notify you by phone as soon as possible.  Submit refill requests through Inaika or call your pharmacy and they will forward the refill request to us. Please allow 3 business days for your refill to be completed.          Additional Information About Your Visit        Inaika Information     Inaika lets you send  "messages to your doctor, view your test results, renew your prescriptions, schedule appointments and more. To sign up, go to [email or web address] . Click on \"Log in\" on the left side of the screen, which will take you to the Welcome page. Then click on \"Sign up Now\" on the right side of the page.     You will be asked to enter the access code listed below, as well as some personal information. Please follow the directions to create your username and password.     Your access code is: [email or web address]  Expires: 2017  4:11 PM     Your access code will  in 90 days. If you need help or a new code, please call your [place] clinic or [phone].        Care EveryWhere ID     This is your Care EveryWhere ID. This could be used by other organizations to access your [place] medical records  [id number]         Blood Pressure from Last 3 Encounters:   17 137/85   17 128/80   02/04/15 140/90    Weight from Last 3 Encounters:   17 98.7 kg (217 lb 9.6 oz)   17 99.8 kg (220 lb)   02/04/15 95.3 kg (210 lb)              We Performed the Following     HST-Home Sleep Apnea Test        Primary Care Provider Office Phone # Fax #    Haider Bird [phone] [address] [facility] [address]        Equal Access to Services     [facility]: Hadii aad ku hadasho Soomaali, waaxda luqadaha, qaybta kaalmada adeegyada, lissa gillette . So [facility] [phone].    ATENCIÓN: Si habla samaraañol, tiene a dubon disposición servicios gratuitos de asistencia lingüística. Nicola al [phone].    We comply with applicable federal civil rights laws and [place] laws. We do not discriminate on the basis of race, color, national origin, age, disability, sex, sexual orientation, or gender identity.            Thank you!     Thank you for choosing [facility]  for your care. Our goal is always to provide you with excellent care. Hearing back from " our patients is one way we can continue to improve our services. Please take a few minutes to complete the written survey that you may receive in the mail after your visit with us. Thank you!             Your Updated Medication List - Protect others around you: Learn how to safely use, store and throw away your medicines at www.disposemymeds.org.          This list is accurate as of: 11/16/17  7:56 AM.  Always use your most recent med list.                   Brand Name Dispense Instructions for use Diagnosis    albuterol 108 (90 BASE) MCG/ACT Inhaler    PROAIR HFA/PROVENTIL HFA/VENTOLIN HFA    3 Inhaler    Inhale 2 puffs into the lungs every 6 hours as needed for shortness of breath / dyspnea or wheezing        fluticasone-salmeterol 250-50 MCG/DOSE diskus inhaler    ADVAIR    3 Inhaler    Inhale 1 puff into the lungs 2 times daily    Moderate persistent asthma without complication       LORazepam 0.5 MG tablet    ATIVAN    30 tablet    Take 1 tablet (0.5 mg) by mouth every 12 hours as needed for anxiety    Anxiety disorder, unspecified type       NASACORT AQ NA

## 2017-11-16 NOTE — PROGRESS NOTES
Pt is completing a home sleep test for concerns primarily related to snoring and suspected GRISELDA. He was instructed on how to put on the Noxturnal T3 device and associated equipment before going to bed and given the opportunity to practice putting it on before leaving the sleep center. He was reminded to bring the home sleep test kit back to the center  tomorrow for download and reporting.

## 2017-11-17 ENCOUNTER — OFFICE VISIT (OUTPATIENT)
Dept: SLEEP MEDICINE | Facility: CLINIC | Age: 32
End: 2017-11-17
Payer: COMMERCIAL

## 2017-11-17 ENCOUNTER — APPOINTMENT (OUTPATIENT)
Dept: SLEEP MEDICINE | Facility: CLINIC | Age: 32
End: 2017-11-17
Payer: COMMERCIAL

## 2017-11-17 VITALS
SYSTOLIC BLOOD PRESSURE: 139 MMHG | BODY MASS INDEX: 31.21 KG/M2 | DIASTOLIC BLOOD PRESSURE: 83 MMHG | HEART RATE: 71 BPM | HEIGHT: 70 IN | OXYGEN SATURATION: 98 % | WEIGHT: 218 LBS

## 2017-11-17 DIAGNOSIS — G47.33 OSA (OBSTRUCTIVE SLEEP APNEA): Primary | ICD-10-CM

## 2017-11-17 PROCEDURE — 99214 OFFICE O/P EST MOD 30 MIN: CPT | Performed by: PHYSICIAN ASSISTANT

## 2017-11-17 NOTE — NURSING NOTE
"Chief Complaint   Patient presents with     Study Results     hst results       Initial /83  Pulse 71  Ht 1.778 m (5' 10\")  Wt 98.9 kg (218 lb)  SpO2 98%  BMI 31.28 kg/m2 Estimated body mass index is 31.28 kg/(m^2) as calculated from the following:    Height as of this encounter: 1.778 m (5' 10\").    Weight as of this encounter: 98.9 kg (218 lb).  Medication Reconciliation: complete    "

## 2017-11-17 NOTE — PATIENT INSTRUCTIONS

## 2017-11-17 NOTE — PROGRESS NOTES
"  Chief Complaint   Patient presents with     Study Results     hst results       Prabhjot Artis is a 32 year old male who returns to [facility] for review of sleep testing results. He presented with loud snoring, witnessed apnea, non-refreshing sleep, daytime fatigue/sleepiness(ESS9), crowded oropharynx and neck circumference of 17.5 inches.    Estimated body mass index is 31.28 kg/(m^2) as calculated from the following:    Height as of this encounter: 1.778 m (5' 10\").    Weight as of this encounter: 98.9 kg (218 lb).  Total score - Gay: 9 (11/3/2017  1:00 PM)  STOP-BAN/8    Home Sleep Apnea Testing - 17: 217 lbs 0 oz: AHI 9.4/hr. Supine AHI 34.2/hr.   Oxygen Brannon of 86%.  Baseline 93.5%.  Sp02 =< 88% for 1.0 minutes  He slept on his back (22%), prone (0%), left (10.2) and right (67.7%) sides.   Analysis time: 429.4 minutes.     Signal quality of Oxymeter 100% Good  Nasal Cannula 100% Good  RIP belts 100% Good.     Prabhjot Artis reports that he slept Fair.     Results were reviewed in detail today with Prabhjot and a copy given to him for his records.    Reviewed by team: Allergies       Reviewed by provider:          Problem List:  Patient Active Problem List    Diagnosis Date Noted     Anxiety disorder, unspecified type 2017     Priority: Medium     Overweight 2017     Priority: Medium     Hyperglycemia 2015     Priority: Medium     CARDIOVASCULAR SCREENING; LDL GOAL LESS THAN 160 2015     Priority: Medium     Moderate persistent asthma 2015     Priority: Medium     Hyperlipidemia with target LDL less than 160      Priority: Medium     Diagnosis updated by automated process. Provider to review and confirm.       Social anxiety disorder      Priority: Medium     Class 1 obesity due to excess calories with body mass index (BMI) of 31.0 to 31.9 in adult, unspecified whether serious comorbidity present 2017     Priority: Low        /83  Pulse " "71  Ht 1.778 m (5' 10\")  Wt 98.9 kg (218 lb)  SpO2 98%  BMI 31.28 kg/m2    Impression/Plan:  Mild Obstructive Sleep Apnea, severe in the supine position.  Sleep associated hypoxemia was not present.     Treatment options discussed today including auto-CPAP at 5-15 cmH2O, oral appliance therapy, positional therapy, polysomnography with full night PAP titration or no treatment.  Elected treatment with oral appliance therapy and Sleep Dental referral placed.      He will follow up with me as needed.      Twenty-five minutes spent with patient, all of which were spent face-to-face counseling, consulting, coordinating plan of care regarding GRISELDA.      Deborah Nogueira PA-C    CC:  Haider Bird.  "

## 2017-11-17 NOTE — PROGRESS NOTES
This HST performed using a Noxturnal T3 device which recorded snore, sound, movement activity, body position, nasal pressure, oronasal thermal airflow, pulse, oximetry and both chest and abdominal respiratory effort. HST data was confined to the time patients states they were in bed.   Patient was score using 4% rules. Patient respiratory events showed an AHI 9.4 with audible loud snoring. Patient SP02 below 89% was 1.0 minutes. Overall signal quality was good.    Pt will follow up with sleep provider to determine appropriate therapy.     Ordering Jero DWYER Abass, PA

## 2017-11-17 NOTE — MR AVS SNAPSHOT
After Visit Summary   11/17/2017    Prabhjot Artis    MRN: [id number]           Patient Information     Date Of Birth          1985        Visit Information        Provider Department      11/17/2017 2:00 PM Deborah Nogueira PA [facility]        Today's Diagnoses     GRISELDA (obstructive sleep apnea)    -  1      Care Instructions      Your BMI is Body mass index is 31.28 kg/(m^2).  Weight management is a personal decision.  If you are interested in exploring weight loss strategies, the following discussion covers the approaches that may be successful. Body mass index (BMI) is one way to tell whether you are at a healthy weight, overweight, or obese. It measures your weight in relation to your height.  A BMI of 18.5 to 24.9 is in the healthy range. A person with a BMI of 25 to 29.9 is considered overweight, and someone with a BMI of 30 or greater is considered obese. More than two-thirds of American adults are considered overweight or obese.  Being overweight or obese increases the risk for further weight gain. Excess weight may lead to heart disease and diabetes.  Creating and following plans for healthy eating and physical activity may help you improve your health.  Weight control is part of healthy lifestyle and includes exercise, emotional health, and healthy eating habits. Careful eating habits lifelong are the mainstay of weight control. Though there are significant health benefits from weight loss, long-term weight loss with diet alone may be very difficult to achieve- studies show long-term success with dietary management in less than 10% of people. Attaining a healthy weight may be especially difficult to achieve in those with severe obesity. In some cases, medications, devices and surgical management might be considered.  What can you do?  If you are overweight or obese and are interested in methods for weight loss, you should discuss this with your provider.     Consider  reducing daily calorie intake by 500 calories.     Keep a food journal.     Avoiding skipping meals, consider cutting portions instead.    Diet combined with exercise helps maintain muscle while optimizing fat loss. Strength training is particularly important for building and maintaining muscle mass. Exercise helps reduce stress, increase energy, and improves fitness. Increasing exercise without diet control, however, may not burn enough calories to loose weight.       Start walking three days a week 10-20 minutes at a time    Work towards walking thirty minutes five days a week     Eventually, increase the speed of your walking for 1-2 minutes at time    In addition, we recommend that you review healthy lifestyles and methods for weight loss available through the National Institutes of Health patient information sites:  http://win.niddk.nih.gov/publications/index.htm    And look into health and wellness programs that may be available through your health insurance provider, employer, local community center, or elizabeth club.    Weight management plan: Patient was referred to their PCP to discuss a diet and exercise plan.        Your Body mass index is 31.28 kg/(m^2).  Weight management is a personal decision.  If you are interested in exploring weight loss strategies, the following discussion covers the approaches that may be successful. Body mass index (BMI) is one way to tell whether you are at a healthy weight, overweight, or obese. It measures your weight in relation to your height.  A BMI of 18.5 to 24.9 is in the healthy range. A person with a BMI of 25 to 29.9 is considered overweight, and someone with a BMI of 30 or greater is considered obese. More than two-thirds of American adults are considered overweight or obese.  Being overweight or obese increases the risk for further weight gain. Excess weight may lead to heart disease and diabetes.  Creating and following plans for healthy eating and physical activity  may help you improve your health.  Weight control is part of healthy lifestyle and includes exercise, emotional health, and healthy eating habits. Careful eating habits lifelong are the mainstay of weight control. Though there are significant health benefits from weight loss, long-term weight loss with diet alone may be very difficult to achieve- studies show long-term success with dietary management in less than 10% of people. Attaining a healthy weight may be especially difficult to achieve in those with severe obesity. In some cases, medications, devices and surgical management might be considered.  What can you do?  If you are overweight or obese and are interested in methods for weight loss, you should discuss this with your provider.     Consider reducing daily calorie intake by 500 calories.     Keep a food journal.     Avoiding skipping meals, consider cutting portions instead.    Diet combined with exercise helps maintain muscle while optimizing fat loss. Strength training is particularly important for building and maintaining muscle mass. Exercise helps reduce stress, increase energy, and improves fitness. Increasing exercise without diet control, however, may not burn enough calories to loose weight.       Start walking three days a week 10-20 minutes at a time    Work towards walking thirty minutes five days a week     Eventually, increase the speed of your walking for 1-2 minutes at time    In addition, we recommend that you review healthy lifestyles and methods for weight loss available through the National Institutes of Health patient information sites:  http://win.niddk.nih.gov/publications/index.htm    And look into health and wellness programs that may be available through your health insurance provider, employer, local community center, or elizabeth club.    Weight management plan: Patient was referred to their PCP to discuss a diet and exercise plan.            Follow-ups after your visit         Additional Services     SLEEP DENTAL REFERRAL       Dental appliance resources recommended by [facility]     Below is a list of dental appliance resources recommended by [facility]   If you wish to choose your own dental sleep dentist, you may identify a provider close to you: [email or web address]    [facility]   Sleep Medicine [facility]   Alvin Mays DDS, MS   [facility]  [address] [place] [address] [address]  [place] [address]   Appointments: [phone]  Fax: [phone]   Email: [email or web address]   [facility] Clinic   Royer Bills, ADOLFO Mobley DDS   [address]   [place] [address]   Appointments: [phone]   Website: [email or web address]    Snoring and Sleep Apnea   [facility]   ELIESER Hernandez DDS  [address]   [place] [address]   Appointments: [phone]   Fax: [phone]   Email: [email or web address]  Website: [facility]venida     [facility]   Office 1   Andrés Sanchez DDS - [DME Medicare]  [address] [place] [address]   [place] [address]  Appointments: [phone]  Fax: [phone]  Website: [employer]    [facility]   Office 2  Andrés Sanchez DDS - [DME Medicare]  [address]  [place] [address]  Appointments: [phone]  Fax: [phone]  Website: [employer]    [facility]   [facility]  Godwin Pratt DDS  [address] [place] [address]  Appointments: [phone]    [facility]   [place] Office   Haider Mobley DDS   [facility]   [address]   [place] [address]   Appointments: [phone]   Fax: [phone]   Website: [employer]     [facility]   [place] Office   Star Jain DDS, MS - [DME Medicare]  [facility]   [address]   [place] [address]   Appointments: [phone]   Fax: [phone]   Website: [facility]     [facility]  John Bull, DMD, MSD - [DME Medicare]  [address]  [place] [address]  Appointments [phone]  Fax: [phone]  Website: [email or web address]    [facility]   [place] Office   Anjali Harley DDS, PhD, [facility]   [address]   [place] [address]   Appointments: [phone]   Fax: [phone]   Website: [employer]     [facility]   [place] Office   Anjali Harley DDS, PhD, MS   [facility] [place]   [address]   [place] [address]   Appointments: [phone]   Fax: [phone]   Website: [email or web address]     [facility]  Stacey Yee DDS  [facility]  [address] [place] [address]  Appointments: [phone]   Fax: [phone]    If you wish to choose your own dental sleep dentist, you may identify a provider close to you: [email or web address]      AHI: 9.4 PSG DATE: 11/16/2017     Return to clinic in as needed for Home Sleep Test to confirm adequacy of Treatment.    Other information regarding this referral: Mandibular repositioning appliance for GRISELDA. If your insurance asks for a CPT code, it is .    Please be aware that coverage of these services is subject to the terms and limitations of your health insurance plan.  Call member services at your health plan with any benefit or coverage questions.      Please bring the following to your appointment:    >>   List of current medications   >>   This referral request   >>   Any documents/labs given to you for this referral                  Who to contact     If you have  "questions or need follow up information about today's clinic visit or your schedule please contact Health system SLEEP [facility] directly at [phone].  Normal or non-critical lab and imaging results will be communicated to you by MyChart, letter or phone within 4 business days after the clinic has received the results. If you do not hear from us within 7 days, please contact the clinic through The Societyhart or phone. If you have a critical or abnormal lab result, we will notify you by phone as soon as possible.  Submit refill requests through myJambi or call your pharmacy and they will forward the refill request to us. Please allow 3 business days for your refill to be completed.          Additional Information About Your Visit        The SocietyharPinpointe Information     myJambi lets you send messages to your doctor, view your test results, renew your prescriptions, schedule appointments and more. To sign up, go to [email or web address] . Click on \"Log in\" on the left side of the screen, which will take you to the Welcome page. Then click on \"Sign up Now\" on the right side of the page.     You will be asked to enter the access code listed below, as well as some personal information. Please follow the directions to create your username and password.     Your access code is: [email or web address]  Expires: 2017  4:11 PM     Your access code will  in 90 days. If you need help or a new code, please call your [place] clinic or [phone].        Care EveryWhere ID     This is your Care EveryWhere ID. This could be used by other organizations to access your [place] medical records  [id number]        Your Vitals Were     Pulse Height Pulse Oximetry BMI (Body Mass Index)          71 1.778 m (5' 10\") 98% 31.28 kg/m2         Blood Pressure from Last 3 Encounters:   17 139/83   17 137/85   17 128/80    Weight from Last 3 Encounters:   17 98.9 kg (218 lb)   17 98.7 kg (217 lb 9.6 oz)   17 99.8 kg " (220 lb)              We Performed the Following     SLEEP DENTAL REFERRAL        Primary Care Provider Office Phone # Fax #    Haider Bird [phone] [address] [facility] [address]        Equal Access to Services     [facility] : Hadii aad ku hadwhito Soomaali, waaxda luqadaha, qaybta kaalmada adeegyada, lissa lambertn paul calhoun lakatieriri huynh. So [facility] [phone].    ATENCIÓN: Si habla español, tiene a dubon disposición servicios gratuitos de asistencia lingüística. Llame al [phone].    We comply with applicable federal civil rights laws and [place] laws. We do not discriminate on the basis of race, color, national origin, age, disability, sex, sexual orientation, or gender identity.            Thank you!     Thank you for choosing [facility]  for your care. Our goal is always to provide you with excellent care. Hearing back from our patients is one way we can continue to improve our services. Please take a few minutes to complete the written survey that you may receive in the mail after your visit with us. Thank you!             Your Updated Medication List - Protect others around you: Learn how to safely use, store and throw away your medicines at www.disposemymeds.org.          This list is accurate as of: 11/17/17  2:05 PM.  Always use your most recent med list.                   Brand Name Dispense Instructions for use Diagnosis    albuterol 108 (90 BASE) MCG/ACT Inhaler    PROAIR HFA/PROVENTIL HFA/VENTOLIN HFA    3 Inhaler    Inhale 2 puffs into the lungs every 6 hours as needed for shortness of breath / dyspnea or wheezing        fluticasone-salmeterol 250-50 MCG/DOSE diskus inhaler    ADVAIR    3 Inhaler    Inhale 1 puff into the lungs 2 times daily    Moderate persistent asthma without complication       LORazepam 0.5 MG tablet    ATIVAN    30 tablet    Take 1 tablet (0.5 mg) by mouth every 12 hours as needed for anxiety    Anxiety disorder,  unspecified type       NASACORT AQ NA

## 2017-11-21 PROBLEM — G47.33 OSA (OBSTRUCTIVE SLEEP APNEA): Chronic | Status: ACTIVE | Noted: 2017-11-17

## 2017-11-21 PROBLEM — E78.1 HYPERTRIGLYCERIDEMIA: Chronic | Status: ACTIVE | Noted: 2017-11-21

## 2017-11-21 NOTE — PROCEDURES
"HOME SLEEP STUDY INTERPRETATION    Patient: Prabhjot Artis  MRN: [id number]  YOB: 1985  Study Date: 11/16/2017  Referring Provider: Haider Bird  Ordering Provider: OBDULIO Blanco     Indications for Home Study: Prabhjot Artis is a 32 year old male with symptoms suggestive of obstructive sleep apnea.    Estimated body mass index is 31.28 kg/(m^2) as calculated from the following:    Height as of 11/17/17: 1.778 m (5' 10\").    Weight as of 11/17/17: 98.9 kg (218 lb).  Total score - Gunter: 9 (11/3/2017  1:00 PM)  StopBang Total Score: 5 (11/17/2017  2:00 PM)    Data: A full night home sleep study was performed recording the standard physiologic parameters including body position, movement, sound, nasal pressure, thermal oral airflow, chest and abdominal movements with respiratory inductance plethysmography, and oxygen saturation by pulse oximetry. Pulse rate was estimated by oximetry recording. This study was considered adequate based on > 4 hours of quality oximetry and respiratory recording. As specified by the AASM Manual for the Scoring of Sleep and Associated events, version 2.3, Rule VIII.D 1B, 4% oxygen desaturation scoring for hypopneas is used as a standard of care on all home sleep apnea testing.    Analysis Time:  429 minutes    Respiration:   Sleep Associated Hypoxemia: sustained hypoxemia was not present. Baseline oxygen saturation was 94%.  Time with saturation less than or equal to 88% was 1 minutes. The lowest oxygen saturation was 86%.   Snoring: Snoring was present.  Respiratory events: The home study revealed a presence of 31 obstructive apneas and 8 mixed and central apneas. There were 29 hypopneas resulting in a combined apnea/hypopnea index [AHI] of 9.4 events per hour.  AHI was 34.2 per hour supine, n/a per hour prone, 5.5 per hour on left side, and 1.9 per hour on right side.   Pattern: Excluding events noted above, respiratory rate and pattern was " Normal.    Position: Percent of time spent: supine - 22%, prone - 0%, on left - 10%, on right - 67  8%.    Heart Rate: By pulse oximetry normal rate was noted.     Assessment:   Mild obstructive sleep apnea, principally supine related wjen it is severe  Sleep associated hypoxemia was not present.    Recommendations:  Consider auto-CPAP at 5-18 cmH2O, oral appliance therapy or positional therapy.  Suggest optimizing sleep hygiene and avoiding sleep deprivation.  Weight management.    Diagnosis Code(s): Obstructive Sleep Apnea G47.33    Scout Jaime MD, November 21, 2017   Diplomate, [employer] Board of [employer], Sleep Medicine

## 2018-09-04 ENCOUNTER — TELEPHONE (OUTPATIENT)
Dept: OTHER | Facility: CLINIC | Age: 33
End: 2018-09-04

## 2018-09-04 NOTE — TELEPHONE ENCOUNTER
9/4/2018    Call Regarding Onboarding P1 REMY    Attempt 1    Message on voicemail     Comments: 1 ADULT, 3 CHILD      Outreach   CC

## 2018-09-14 NOTE — TELEPHONE ENCOUNTER
9/14/2018    Call Regarding Onboarding P1 REMY    Attempt 2    Message on voicemail     Comments: 1 ADULT, 3 CHILD      Outreach   CC